# Patient Record
Sex: MALE | Race: WHITE | NOT HISPANIC OR LATINO | URBAN - METROPOLITAN AREA
[De-identification: names, ages, dates, MRNs, and addresses within clinical notes are randomized per-mention and may not be internally consistent; named-entity substitution may affect disease eponyms.]

---

## 2018-09-19 ENCOUNTER — OUTPATIENT (OUTPATIENT)
Dept: OUTPATIENT SERVICES | Facility: HOSPITAL | Age: 73
LOS: 1 days | Discharge: HOME | End: 2018-09-19

## 2018-09-19 DIAGNOSIS — I69.020 APHASIA FOLLOWING NONTRAUMATIC SUBARACHNOID HEMORRHAGE: ICD-10-CM

## 2020-02-04 ENCOUNTER — INPATIENT (INPATIENT)
Facility: HOSPITAL | Age: 75
LOS: 0 days | Discharge: ANOTHER HOSPITAL | End: 2020-02-05
Attending: INTERNAL MEDICINE | Admitting: INTERNAL MEDICINE
Payer: MEDICARE

## 2020-02-04 VITALS — OXYGEN SATURATION: 99 % | RESPIRATION RATE: 20 BRPM | HEART RATE: 100 BPM

## 2020-02-04 DIAGNOSIS — R55 SYNCOPE AND COLLAPSE: ICD-10-CM

## 2020-02-04 DIAGNOSIS — C15.9 MALIGNANT NEOPLASM OF ESOPHAGUS, UNSPECIFIED: ICD-10-CM

## 2020-02-04 DIAGNOSIS — K31.1 ADULT HYPERTROPHIC PYLORIC STENOSIS: ICD-10-CM

## 2020-02-04 DIAGNOSIS — Z96.649 PRESENCE OF UNSPECIFIED ARTIFICIAL HIP JOINT: Chronic | ICD-10-CM

## 2020-02-04 DIAGNOSIS — N40.0 BENIGN PROSTATIC HYPERPLASIA WITHOUT LOWER URINARY TRACT SYMPTOMS: ICD-10-CM

## 2020-02-04 DIAGNOSIS — M1A.9XX0 CHRONIC GOUT, UNSPECIFIED, WITHOUT TOPHUS (TOPHI): ICD-10-CM

## 2020-02-04 DIAGNOSIS — Z93.1 GASTROSTOMY STATUS: ICD-10-CM

## 2020-02-04 DIAGNOSIS — F32.9 MAJOR DEPRESSIVE DISORDER, SINGLE EPISODE, UNSPECIFIED: ICD-10-CM

## 2020-02-04 DIAGNOSIS — R53.1 WEAKNESS: ICD-10-CM

## 2020-02-04 DIAGNOSIS — Z91.81 HISTORY OF FALLING: ICD-10-CM

## 2020-02-04 DIAGNOSIS — I69.320 APHASIA FOLLOWING CEREBRAL INFARCTION: ICD-10-CM

## 2020-02-04 DIAGNOSIS — Z87.891 PERSONAL HISTORY OF NICOTINE DEPENDENCE: ICD-10-CM

## 2020-02-04 DIAGNOSIS — H26.9 UNSPECIFIED CATARACT: Chronic | ICD-10-CM

## 2020-02-04 DIAGNOSIS — K86.2 CYST OF PANCREAS: ICD-10-CM

## 2020-02-04 LAB
ALBUMIN SERPL ELPH-MCNC: 3.5 G/DL — SIGNIFICANT CHANGE UP (ref 3.5–5.2)
ALP SERPL-CCNC: 79 U/L — SIGNIFICANT CHANGE UP (ref 30–115)
ALT FLD-CCNC: 56 U/L — HIGH (ref 0–41)
ANION GAP SERPL CALC-SCNC: 12 MMOL/L — SIGNIFICANT CHANGE UP (ref 7–14)
APPEARANCE UR: ABNORMAL
AST SERPL-CCNC: 40 U/L — SIGNIFICANT CHANGE UP (ref 0–41)
BACTERIA # UR AUTO: NEGATIVE — SIGNIFICANT CHANGE UP
BASE EXCESS BLDV CALC-SCNC: 2.5 MMOL/L — HIGH (ref -2–2)
BASE EXCESS BLDV CALC-SCNC: 3.7 MMOL/L — HIGH (ref -2–2)
BASOPHILS # BLD AUTO: 0.06 K/UL — SIGNIFICANT CHANGE UP (ref 0–0.2)
BASOPHILS NFR BLD AUTO: 0.4 % — SIGNIFICANT CHANGE UP (ref 0–1)
BILIRUB SERPL-MCNC: 0.2 MG/DL — SIGNIFICANT CHANGE UP (ref 0.2–1.2)
BILIRUB UR-MCNC: NEGATIVE — SIGNIFICANT CHANGE UP
BUN SERPL-MCNC: 50 MG/DL — HIGH (ref 10–20)
CA-I SERPL-SCNC: 1.2 MMOL/L — SIGNIFICANT CHANGE UP (ref 1.12–1.3)
CA-I SERPL-SCNC: 1.27 MMOL/L — SIGNIFICANT CHANGE UP (ref 1.12–1.3)
CALCIUM SERPL-MCNC: 9.4 MG/DL — SIGNIFICANT CHANGE UP (ref 8.5–10.1)
CHLORIDE SERPL-SCNC: 97 MMOL/L — LOW (ref 98–110)
CO2 SERPL-SCNC: 27 MMOL/L — SIGNIFICANT CHANGE UP (ref 17–32)
COLOR SPEC: SIGNIFICANT CHANGE UP
CREAT SERPL-MCNC: 1.1 MG/DL — SIGNIFICANT CHANGE UP (ref 0.7–1.5)
DIFF PNL FLD: NEGATIVE — SIGNIFICANT CHANGE UP
EOSINOPHIL # BLD AUTO: 0.11 K/UL — SIGNIFICANT CHANGE UP (ref 0–0.7)
EOSINOPHIL NFR BLD AUTO: 0.7 % — SIGNIFICANT CHANGE UP (ref 0–8)
EPI CELLS # UR: 0 /HPF — SIGNIFICANT CHANGE UP (ref 0–5)
GAS PNL BLDV: 134 MMOL/L — LOW (ref 136–145)
GAS PNL BLDV: 137 MMOL/L — SIGNIFICANT CHANGE UP (ref 136–145)
GAS PNL BLDV: SIGNIFICANT CHANGE UP
GAS PNL BLDV: SIGNIFICANT CHANGE UP
GLUCOSE SERPL-MCNC: 104 MG/DL — HIGH (ref 70–99)
GLUCOSE UR QL: NEGATIVE — SIGNIFICANT CHANGE UP
HCO3 BLDV-SCNC: 28 MMOL/L — SIGNIFICANT CHANGE UP (ref 22–29)
HCO3 BLDV-SCNC: 31 MMOL/L — HIGH (ref 22–29)
HCT VFR BLD CALC: 32.8 % — LOW (ref 42–52)
HCT VFR BLDA CALC: 27 % — LOW (ref 34–44)
HCT VFR BLDA CALC: 35.3 % — SIGNIFICANT CHANGE UP (ref 34–44)
HGB BLD CALC-MCNC: 11.5 G/DL — LOW (ref 14–18)
HGB BLD CALC-MCNC: 8.8 G/DL — LOW (ref 14–18)
HGB BLD-MCNC: 10.7 G/DL — LOW (ref 14–18)
HYALINE CASTS # UR AUTO: 0 /LPF — SIGNIFICANT CHANGE UP (ref 0–7)
IMM GRANULOCYTES NFR BLD AUTO: 0.7 % — HIGH (ref 0.1–0.3)
KETONES UR-MCNC: NEGATIVE — SIGNIFICANT CHANGE UP
LACTATE BLDV-MCNC: 1.6 MMOL/L — SIGNIFICANT CHANGE UP (ref 0.5–1.6)
LACTATE BLDV-MCNC: 2.5 MMOL/L — HIGH (ref 0.5–1.6)
LEUKOCYTE ESTERASE UR-ACNC: NEGATIVE — SIGNIFICANT CHANGE UP
LYMPHOCYTES # BLD AUTO: 15.4 % — LOW (ref 20.5–51.1)
LYMPHOCYTES # BLD AUTO: 2.53 K/UL — SIGNIFICANT CHANGE UP (ref 1.2–3.4)
MCHC RBC-ENTMCNC: 27.9 PG — SIGNIFICANT CHANGE UP (ref 27–31)
MCHC RBC-ENTMCNC: 32.6 G/DL — SIGNIFICANT CHANGE UP (ref 32–37)
MCV RBC AUTO: 85.4 FL — SIGNIFICANT CHANGE UP (ref 80–94)
MONOCYTES # BLD AUTO: 1.25 K/UL — HIGH (ref 0.1–0.6)
MONOCYTES NFR BLD AUTO: 7.6 % — SIGNIFICANT CHANGE UP (ref 1.7–9.3)
NEUTROPHILS # BLD AUTO: 12.37 K/UL — HIGH (ref 1.4–6.5)
NEUTROPHILS NFR BLD AUTO: 75.2 % — SIGNIFICANT CHANGE UP (ref 42.2–75.2)
NITRITE UR-MCNC: NEGATIVE — SIGNIFICANT CHANGE UP
NRBC # BLD: 0 /100 WBCS — SIGNIFICANT CHANGE UP (ref 0–0)
PCO2 BLDV: 48 MMHG — SIGNIFICANT CHANGE UP (ref 41–51)
PCO2 BLDV: 61 MMHG — HIGH (ref 41–51)
PH BLDV: 7.32 — SIGNIFICANT CHANGE UP (ref 7.26–7.43)
PH BLDV: 7.37 — SIGNIFICANT CHANGE UP (ref 7.26–7.43)
PH UR: 8 — SIGNIFICANT CHANGE UP (ref 5–8)
PLATELET # BLD AUTO: 309 K/UL — SIGNIFICANT CHANGE UP (ref 130–400)
PO2 BLDV: 16 MMHG — LOW (ref 20–40)
PO2 BLDV: 31 MMHG — SIGNIFICANT CHANGE UP (ref 20–40)
POTASSIUM BLDV-SCNC: 4.4 MMOL/L — SIGNIFICANT CHANGE UP (ref 3.3–5.6)
POTASSIUM BLDV-SCNC: 4.8 MMOL/L — SIGNIFICANT CHANGE UP (ref 3.3–5.6)
POTASSIUM SERPL-MCNC: 5.4 MMOL/L — HIGH (ref 3.5–5)
POTASSIUM SERPL-SCNC: 5.4 MMOL/L — HIGH (ref 3.5–5)
PROT SERPL-MCNC: 6 G/DL — SIGNIFICANT CHANGE UP (ref 6–8)
PROT UR-MCNC: SIGNIFICANT CHANGE UP
RBC # BLD: 3.84 M/UL — LOW (ref 4.7–6.1)
RBC # FLD: 14.4 % — SIGNIFICANT CHANGE UP (ref 11.5–14.5)
RBC CASTS # UR COMP ASSIST: 1 /HPF — SIGNIFICANT CHANGE UP (ref 0–4)
SAO2 % BLDV: 14 % — SIGNIFICANT CHANGE UP
SAO2 % BLDV: 52 % — SIGNIFICANT CHANGE UP
SODIUM SERPL-SCNC: 136 MMOL/L — SIGNIFICANT CHANGE UP (ref 135–146)
SP GR SPEC: 1.03 — HIGH (ref 1.01–1.02)
TROPONIN T SERPL-MCNC: <0.01 NG/ML — SIGNIFICANT CHANGE UP
UROBILINOGEN FLD QL: SIGNIFICANT CHANGE UP
WBC # BLD: 16.43 K/UL — HIGH (ref 4.8–10.8)
WBC # FLD AUTO: 16.43 K/UL — HIGH (ref 4.8–10.8)
WBC UR QL: 1 /HPF — SIGNIFICANT CHANGE UP (ref 0–5)

## 2020-02-04 PROCEDURE — 74177 CT ABD & PELVIS W/CONTRAST: CPT | Mod: 26

## 2020-02-04 PROCEDURE — 72170 X-RAY EXAM OF PELVIS: CPT | Mod: 26

## 2020-02-04 PROCEDURE — 74018 RADEX ABDOMEN 1 VIEW: CPT | Mod: 26

## 2020-02-04 PROCEDURE — 71045 X-RAY EXAM CHEST 1 VIEW: CPT | Mod: 26

## 2020-02-04 PROCEDURE — 72125 CT NECK SPINE W/O DYE: CPT | Mod: 26

## 2020-02-04 PROCEDURE — 99285 EMERGENCY DEPT VISIT HI MDM: CPT | Mod: GC

## 2020-02-04 PROCEDURE — 93010 ELECTROCARDIOGRAM REPORT: CPT

## 2020-02-04 PROCEDURE — 70450 CT HEAD/BRAIN W/O DYE: CPT | Mod: 26

## 2020-02-04 PROCEDURE — 99221 1ST HOSP IP/OBS SF/LOW 40: CPT

## 2020-02-04 RX ORDER — METRONIDAZOLE 500 MG
500 TABLET ORAL ONCE
Refills: 0 | Status: COMPLETED | OUTPATIENT
Start: 2020-02-04 | End: 2020-02-04

## 2020-02-04 RX ORDER — MORPHINE SULFATE 50 MG/1
2 CAPSULE, EXTENDED RELEASE ORAL EVERY 6 HOURS
Refills: 0 | Status: DISCONTINUED | OUTPATIENT
Start: 2020-02-04 | End: 2020-02-05

## 2020-02-04 RX ORDER — IOHEXOL 300 MG/ML
30 INJECTION, SOLUTION INTRAVENOUS ONCE
Refills: 0 | Status: COMPLETED | OUTPATIENT
Start: 2020-02-04 | End: 2020-02-04

## 2020-02-04 RX ORDER — SODIUM CHLORIDE 9 MG/ML
2000 INJECTION INTRAMUSCULAR; INTRAVENOUS; SUBCUTANEOUS ONCE
Refills: 0 | Status: COMPLETED | OUTPATIENT
Start: 2020-02-04 | End: 2020-02-04

## 2020-02-04 RX ORDER — CHLORHEXIDINE GLUCONATE 213 G/1000ML
1 SOLUTION TOPICAL
Refills: 0 | Status: DISCONTINUED | OUTPATIENT
Start: 2020-02-04 | End: 2020-02-05

## 2020-02-04 RX ORDER — CIPROFLOXACIN LACTATE 400MG/40ML
400 VIAL (ML) INTRAVENOUS ONCE
Refills: 0 | Status: COMPLETED | OUTPATIENT
Start: 2020-02-04 | End: 2020-02-04

## 2020-02-04 RX ORDER — MORPHINE SULFATE 50 MG/1
2 CAPSULE, EXTENDED RELEASE ORAL ONCE
Refills: 0 | Status: DISCONTINUED | OUTPATIENT
Start: 2020-02-04 | End: 2020-02-04

## 2020-02-04 RX ORDER — HEPARIN SODIUM 5000 [USP'U]/ML
5000 INJECTION INTRAVENOUS; SUBCUTANEOUS EVERY 8 HOURS
Refills: 0 | Status: DISCONTINUED | OUTPATIENT
Start: 2020-02-04 | End: 2020-02-05

## 2020-02-04 RX ADMIN — MORPHINE SULFATE 2 MILLIGRAM(S): 50 CAPSULE, EXTENDED RELEASE ORAL at 15:11

## 2020-02-04 RX ADMIN — SODIUM CHLORIDE 2000 MILLILITER(S): 9 INJECTION INTRAMUSCULAR; INTRAVENOUS; SUBCUTANEOUS at 14:48

## 2020-02-04 RX ADMIN — IOHEXOL 30 MILLILITER(S): 300 INJECTION, SOLUTION INTRAVENOUS at 16:30

## 2020-02-04 RX ADMIN — Medication 100 MILLIGRAM(S): at 17:19

## 2020-02-04 RX ADMIN — MORPHINE SULFATE 2 MILLIGRAM(S): 50 CAPSULE, EXTENDED RELEASE ORAL at 17:19

## 2020-02-04 RX ADMIN — Medication 200 MILLIGRAM(S): at 17:13

## 2020-02-04 NOTE — ED PROVIDER NOTE - PHYSICAL EXAMINATION
PHYSICAL EXAM: I have reviewed current vital signs.  GENERAL: NAD, elderly, mild diffuse pallor.  HEAD:  Normocephalic, atraumatic.  EYES: Conjunctiva and sclera clear.  ENT: MMM, no erythema/exudates.  NECK: Supple, FROM.  CHEST/LUNG: Clear to auscultation bilaterally; no wheezes, rales, or rhonchi.  HEART: Regular rate and rhythm, normal S1 and S2; no murmurs, rubs, or gallops.  ABDOMEN: Soft, nontender, mild diffuse TTP, no peritoneal signs, PEG tube in place.  EXTREMITIES:  2+ peripheral pulses; no edema.  NEUROLOGY: A&O x 3. Motor 5/5. No focal neurological deficits.  SKIN: Warm and dry. PHYSICAL EXAM: I have reviewed current vital signs.  GENERAL: NAD, elderly, mild diffuse pallor.  HEAD:  Normocephalic, atraumatic.  EYES: Conjunctiva and sclera clear.  ENT: MMM, no erythema/exudates.  NECK: Supple, FROM.  CHEST/LUNG: Clear to auscultation bilaterally; no wheezes, rales, or rhonchi.  HEART: Regular rate and rhythm, normal S1 and S2; no murmurs, rubs, or gallops.  ABDOMEN: Soft, nontender, mild diffuse TTP, mild distention, no peritoneal signs, PEG tube in place.  EXTREMITIES:  2+ peripheral pulses; no edema.  NEUROLOGY: A&O x 3. Motor 5/5. No focal neurological deficits.  SKIN: Warm and dry.

## 2020-02-04 NOTE — H&P ADULT - NSHPREVIEWOFSYSTEMS_GEN_ALL_CORE
ROS : As per HPI    Vital Signs Last 24 Hrs  T(C): 36.1 (04 Feb 2020 15:46), Max: 36.4 (04 Feb 2020 14:48)  T(F): 96.9 (04 Feb 2020 15:46), Max: 97.5 (04 Feb 2020 14:48)  HR: 86 (04 Feb 2020 21:09) (86 - 100)  BP: 104/67 (04 Feb 2020 21:09) (98/61 - 104/67)  RR: 18 (04 Feb 2020 21:09) (18 - 20)  SpO2: 98% (04 Feb 2020 21:09) (98% - 99%)

## 2020-02-04 NOTE — ED ADULT NURSE REASSESSMENT NOTE - NS ED NURSE REASSESS COMMENT FT1
Pt assessed. G tube to low intermittent suction maintained per MD order. Family at bedside. Pt resting comfortably in bed. Will cont to monitor.

## 2020-02-04 NOTE — ED PROVIDER NOTE - PROGRESS NOTE DETAILS
CO- surgery consulted for partial gastric outlet obstruction pt endorsed to Dr. Crane- pending surgery recs, dispo Oncology care is at [facility]. Spoke to surgery resident loreto of [facility], case discussed, fellow currently scrubbed and dr. robles not available at this time. awaiting callback from fellow. G tube was connected to suction. 1200cc of dark fluid drained, not coffee ground. Hb stable compared to prior records patient's family have with them, Hb on 1/27 was 10.7. Had extensive discussion with patient and family at bedside. They wish for patient to be transferred to [facility] where his care is. Initially unable to reach the fellow and thus has no accepting physician. Patient not stable for discharge given possible obstruction and family not wanting to sign AMA so plan now is to admit pending accepting physician for transfer to [facility]. Fellow from oncologic surgery did call back after patient was admitted but states their team was not the one that did the PEG tube and thus cannot accept the transfer. Plan is still to admit with eventual plan for transfer.

## 2020-02-04 NOTE — ED ADULT NURSE NOTE - NSIMPLEMENTINTERV_GEN_ALL_ED
Implemented All Fall with Harm Risk Interventions:  [place] to call system. Call bell, personal items and telephone within reach. Instruct patient to call for assistance. Room bathroom lighting operational. Non-slip footwear when patient is off stretcher. Physically safe environment: no spills, clutter or unnecessary equipment. Stretcher in lowest position, wheels locked, appropriate side rails in place. Provide visual cue, wrist band, yellow gown, etc. Monitor gait and stability. Monitor for mental status changes and reorient to person, place, and time. Review medications for side effects contributing to fall risk. Reinforce activity limits and safety measures with patient and family. Provide visual clues: red socks.

## 2020-02-04 NOTE — H&P ADULT - ASSESSMENT
73 yo male with CVA with aphasia ( 6 yrs ago), NHL, recently diagnosed esophageal Ca s/p PEG tube placement 2 weeks ago, presented with generalized weakness with ?syncopal episode vs unwitnessed fall, found to have partial gastric outlet obstruction with pancreatic cystic mass seen on imaging.     #Generalized weakness with ?syncope  -EKG showed NSR; Likely vasovagal   -Check orthostatic vitals   -Hold BP meds   -Fall precautions  -DVT ppx    #Partial gastric outlet obstruction   -s/p PEG tube placement at [place] 2 weeks ago  -Surgery eval done  -Keep Gtube to gravity or low intermittent suction  -Serial KUB  -No need for NGT placement  -Transfer to [place] in the AM (Dr Elenita Smith is the surgeon who placed Gtube) for further management     #Hx of esophageal Ca   -Oncology care at [facility] [place]  -Pt to have mediport placed on 2/10/2020 for neoadjuvant chemotherapy at [place]    #Pancreatic cystic mass   -11 x8.5mm ; Likely IPMN  -Outpatient oncology follow up    #Hx of CVA with aphasia  - Plavix on hold due to Mediport placement at [place] on 2/10; c/w statin    #BPH - On flomax; Hold for now given low BP  #    #DVT ppx: Heparin sq  #GI ppx: Not indicated  #Diet: NPO  #Activity: IAT  #FULL CODE -to be dicussed w/ oncology at [facility] [place]  #Dispo: Pending transfer to [facility] [place] in the AM once accepting physician available. 73 yo male with CVA with aphasia ( 6 yrs ago), NHL, recently diagnosed esophageal Ca s/p PEG tube placement 2 weeks ago, presented with generalized weakness with ?syncopal episode vs unwitnessed fall, found to have partial gastric outlet obstruction with pancreatic cystic mass seen on imaging.     #Generalized weakness with ?syncope  -EKG showed NSR; Likely vasovagal in nature given low BP at home  -Check orthostatic vitals   -Hold BP meds including carvedilol for now  -Fall precautions  -DVT ppx  -Outpt follow up with cardiology  -Last echo 6 months ago as per wife ,and it showed normal EF (had hx of cardiomyopathy before which resolved) , and normal stress test    #Partial gastric outlet obstruction   -s/p PEG tube placement at [place] 2 weeks ago  -Surgery eval done  -Keep Gtube to gravity or low intermittent suction  -Serial KUB  -No need for NGT placement  -Transfer to [place] in the AM (Dr Elenita Smith is the surgeon who placed Gtube) for further management .   -TRANSFER NUMBER : [id number]    #Hx of esophageal Ca (diagnosed last month)  -s/p PEG tube placement  -Oncology care at [facility] [place]  -Pt to have mediport placed on 2/10/2020 for neoadjuvant chemotherapy at [place]    #Pancreatic cystic mass   -11 x8.5mm ; Likely IPMN  -Outpatient oncology follow up    #Hx of CVA with aphasia  -Plavix on hold due to Mediport placement at [place] on 2/10; c/w statin    #BPH - On flomax  #Gout- c/w colchicine  #Unclear why pt is on desmopressin -as per wife, he had been taking it for years.   #Depression-c/w Escitalopram    #DVT ppx: Heparin sq  #GI ppx: Not indicated  #Diet: NPO  #Activity: IAT  #FULL CODE -to be discussed w/ oncology at [facility]  #Dispo: Pending transfer to [facility] in the AM once accepting physician available. 73 yo male with CVA with aphasia ( 6 yrs ago), NHL, recently diagnosed esophageal Ca s/p PEG tube placement 2 weeks ago, presented with generalized weakness with ?syncopal episode vs unwitnessed fall, found to have partial gastric outlet obstruction with pancreatic cystic mass seen on imaging.     #Generalized weakness with ?syncope  -EKG showed NSR; Likely vasovagal in nature given low BP at home  -Check orthostatic vitals   -Hold BP meds including carvedilol for now  -Fall precautions  -DVT ppx  -Outpt follow up with cardiology  -Last echo 6 months ago as per wife ,and it showed normal EF (had hx of cardiomyopathy before which resolved) , and normal stress test    #Partial gastric outlet obstruction   -s/p PEG tube placement at [place] 2 weeks ago  -Surgery eval done  -Keep Gtube to gravity or low intermittent suction  -Serial KUB  -No need for NGT placement  -Transfer to [place] in the AM (Dr Elenita Smith is the surgeon who placed Gtube) for further management .   -TRANSFER NUMBER : [id number]    #Hx of esophageal Ca (diagnosed last month)  -s/p PEG tube placement  -Oncology care at [facility]  -Pt to have mediport placed on 2/10/2020 for neoadjuvant chemotherapy at [place]    #Pancreatic cystic mass   -11 x8.5mm ; Likely IPMN  -Outpatient oncology follow up    #Hx of CVA with aphasia  -Plavix on hold due to Mediport placement at [place] on 2/10; c/w statin    #BPH - On flomax  #Gout- c/w colchicine  #Unclear why pt is on desmopressin -as per wife, he had been taking it for years.   #Depression-c/w Escitalopram  #NHL -On observation at this time. Outpt follow up    #DVT ppx: Heparin sq  #GI ppx: Not indicated  #Diet: NPO  #Activity: IAT  #FULL CODE -to be discussed w/ oncology at [facility]  #Dispo: Pending transfer to [facility] in the AM once accepting physician available.

## 2020-02-04 NOTE — H&P ADULT - ATTENDING COMMENTS
Patient seen and evaluated. Agree with the resident as above except as noted on my note from 2/5/20  Not verbal. d/w wife.   Pending transfer to [place]

## 2020-02-04 NOTE — ED ADULT NURSE NOTE - OBJECTIVE STATEMENT
Pt BIBA for possible syncope and abd pain at home. As per pt's wife, Pt was closing his eyes and was not responding for approx 5 sec, and had unwitnessed fall today. unknown head trauma. Abrasion noted to R arm. Pt awake and alert. Pt nonverbal. As per pt's wife, pt changed medications yesterday. Denies any other symptoms. Pt scheduled for port access feb 10/2020 and did not take plavix today. HX esophageal cancer

## 2020-02-04 NOTE — ED PROVIDER NOTE - NS ED ROS FT
Constitutional:  No known fevers.  Eyes:  No eye discharge.  Neck:  No neck stiffness.  Cardiac:  No edema.  Resp:  No cough/hemoptysis.   GI:  No vomiting, diarrhea. +Abdominal pain.  :  No hematuria.  Neuro:  No changes in mental status.  Skin:  No skin rash.

## 2020-02-04 NOTE — ED PROVIDER NOTE - CLINICAL SUMMARY MEDICAL DECISION MAKING FREE TEXT BOX
Patient was signed out to me (Dr. Crane) by Dr. Grayson. 75yo M with recently diagnosed esophageal CA s/p PEG tube placement 2 weeks ago presents to ED after fall, possible syncope, weight loss, generalized weakness. Elevated lactate and leukocytosis, ABx and fluid bolus were given. Afebrile. CT showing distended stomach with poss gastric outlet obstruction as well as poss IPMN. Eval by surgery, suggested to place PEG on suction which drained approx 1200mL fluid. No acute surgical intervention. Admitted with plan for transfer to [facility] (see progress notes).

## 2020-02-04 NOTE — ED ADULT TRIAGE NOTE - CHIEF COMPLAINT QUOTE
pt BIBA for possible syncope at home, EMS and RN unable to obtain Blood pressure, pt awake and alert, aphasic from previous stroke. Cardiologist changed patients medications yesterday. Pt pale and cool, denies CP.

## 2020-02-04 NOTE — CONSULT NOTE ADULT - ASSESSMENT
74M with pancreatic mass and gastric outlet obstruction, known Esophageal CA, currently f/u with surgical oncologist at [facility] [place]    Plan:  Resuscitation  Gtube to gravity/low intermittent suction - no need for NG tube placement  After extensive discussion with patients wife - family requests transfer to [facility] [place] [facility] for management/continuity of care, patient without surgical emergency at this time and agree with transfer     d/w Dr. Arteaga

## 2020-02-04 NOTE — ED PROVIDER NOTE - ATTENDING CONTRIBUTION TO CARE
73yo M with PMH of aphasia, CVA, non-hodgkins lymphoma, recently diagnosed esophageal cancer s/p PEG tube placement 2 weeks ago  presents to ed for fall, possible syncope. wife states pt has been losing weight and feeling weaker. states she found pt on the floor. pt unable to communicate if there was loc. no known fever, chills, vomiting, diarrhea, black/bloody stools. +some SOB.    vs bp borderline  gen- elderly, frail  card-rrr  lungs-ctab, no wheezing or rhonchi  abd-soft, distended, diffusely tender, peg in place  neuro- limited, moving all extremities, no gross CN deficits    c/f intra-abd pathology, sbo, colitis, dehydration  belly labs, fluids, ctap w/ oral contrast thru peg, analgesia, surgery prn

## 2020-02-04 NOTE — CONSULT NOTE ADULT - SUBJECTIVE AND OBJECTIVE BOX
CONRADOCYNTHIAJODI [id number]  74y Male PMH listed below with known history of esophageal CA s/p recent PEG at [facility]. Patient planned for Mediport this week for neoadjuvant. Patient presents with low BP and weakness as per wife at bedside. Wife states patient had low BP called cardiologist who reccomend holding meds until BP is above 100, patients wife said he then fell vs syncopized 2/2 weakness. Patient was scanned on admission to r/o traumatic injury and was found to have large pancreatic cystic mass with partial gastric outlet obstruction      PAST MEDICAL & SURGICAL HISTORY:  CVA (cerebral vascular accident) - Aphasia  Esophageal cancer  Non Hodgkin's lymphoma  History of hip replacement  Cataract        MEDICATIONS  (STANDING):    MEDICATIONS  (PRN):      Allergies    No Known Drug Allergies  Originally Entered as [HIVES] reaction to [shrimp] (Unknown)  shellfish (Other)    Intolerances        REVIEW OF SYSTEMS    [ ] A ten-point review of systems was otherwise negative except as noted.  [X] Due to altered mental status/intubation, subjective information were not able to be obtained from the patient. History was obtained, to the extent possible, from review of the chart and collateral sources of information.      Vital Signs Last 24 Hrs  T(C): 36.1 (04 Feb 2020 15:46), Max: 36.4 (04 Feb 2020 14:48)  T(F): 96.9 (04 Feb 2020 15:46), Max: 97.5 (04 Feb 2020 14:48)  HR: 87 (04 Feb 2020 15:46) (87 - 100)  BP: 98/61 (04 Feb 2020 15:46) (98/61 - 100/65)  BP(mean): --  RR: 19 (04 Feb 2020 15:46) (18 - 20)  SpO2: 98% (04 Feb 2020 15:46) (98% - 99%)    PHYSICAL EXAM:  GENERAL: NAD, aphasic, non responsive  CHEST/LUNG: Clear to auscultation bilaterally  HEART: Regular rate and rhythm  ABDOMEN: Soft, distended, PEG in place      LABS:  Labs:  CAPILLARY BLOOD GLUCOSE                              10.7   16.43 )-----------( 309      ( 04 Feb 2020 14:35 )             32.8       Auto Immature Granulocyte %: 0.7 % (02-04-20 @ 14:35)  Auto Neutrophil %: 75.2 % (02-04-20 @ 14:35)    02-04    136  |  97<L>  |  50<H>  ----------------------------<  104<H>  5.4<H>   |  27  |  1.1      Calcium, Total Serum: 9.4 mg/dL (02-04-20 @ 14:35)      LFTs:             6.0  | 0.2  | 40       ------------------[79      ( 04 Feb 2020 14:35 )  3.5  | x    | 56          Lipase:x      Amylase:x         Blood Gas Venous - Lactate: 2.5 mmoL/L (02-04-20 @ 15:03)      Coags:    CARDIAC MARKERS ( 04 Feb 2020 14:35 )  x     / <0.01 ng/mL / x     / x     / x                RADIOLOGY & ADDITIONAL STUDIES:  < from: CT Abdomen and Pelvis w/ Oral Cont and w/ IV Cont (02.04.20 @ 16:45) >  IMPRESSION: Gastrostomy and chest distention are noted. Rule out partial gastric outlet obstruction.    An 11 x 8.5 mm cystic structure is noted in the mid body of the pancreas. This may represent an IPMN    No evidence of acute intra-thoracic, abdominal or pelvic injury or hematoma.    < end of copied text >    < from: CT Head No Cont (02.04.20 @ 16:35) >  Large zone of encephalomalacia, noted in the left frontal-temporal-parietal cortical and deep white matter regions, consistent with the patient's known previous infarction.   Parenchymal atrophy compatible with the patient's age  No CT evidence of acute fracture, intracranial hemorrhage, midline shift, or mass effect.    < end of copied text >    < from: CT Cervical Spine No Cont (02.04.20 @ 16:42) >  Straightening of the cervical lordosis which may be due to muscle spasm or external collar.    No acute fracture or subluxation.    Incidentally noted dilated fluid-filled mid esophagus with an air-fluid level, see series 2 image 138. This could be a transient finding however a distal narrowing cannot be excluded. Clinical correlation is advised.    < end of copied text >  < from: Xray Pelvis AP only (02.04.20 @ 16:13) >  No evidence of acute fracture.      < end of copied text >    < from: Xray Kidney Ureter Bladder (02.04.20 @ 16:12) >  Large gaseous distention of stomach in the left upper abdomen.    Evaluation for free air is limited on this single supine image.    < end of copied text >

## 2020-02-04 NOTE — H&P ADULT - NSICDXPASTMEDICALHX_GEN_ALL_CORE_FT
PAST MEDICAL HISTORY:  CVA (cerebral vascular accident)     Esophageal cancer     Non Hodgkin's lymphoma PAST MEDICAL HISTORY:  CVA (cerebral vascular accident) with aphasia    Depression     Esophageal cancer s/p PEG   Port placement on 2/10 for neoadjuvant therapy    Gout     History of BPH     Non Hodgkin's lymphoma on observation

## 2020-02-04 NOTE — ED ADULT NURSE NOTE - NS ED NURSE RECORD ANOTHER HT AND WT
[de-identified] : Discussed at length the nature of the patients condition. Their bilateral knee symptoms appear secondary to degenerative arthritis with varus deformity. We reviewed operative and nonoperative treatment. While I believe he would eventually benefit from a TKR, he is hesitant to have surgery at this time. Therefore, we will continue nonoperatively.  Since he has had a good prior response, we will seek authorization for another series of bilateral knee Euflexxa injections. Once we receive authorization, we will proceed accordingly. I also suggested home exercise, weight loss, and Advil or Aleve prn. They can continue activities as tolerated.  I did explain that if in the future his pain becomes disabling, that he may need total knee replacement. This was explained in the presence of their wife.  Yes

## 2020-02-04 NOTE — H&P ADULT - NSHPPHYSICALEXAM_GEN_ALL_CORE
PHYSICAL EXAM:  GENERAL: NAD, speaks in full sentences, no signs of respiratory distress  HEAD:  Atraumatic, Normocephalic  EYES: EOMI, PERRLA, conjunctiva and sclera clear  NECK: Supple, No JVD  CHEST/LUNG: Clear to auscultation bilaterally; No wheeze; No crackles; No accessory muscles used  HEART: Regular rate and rhythm; No murmurs;   ABDOMEN: Soft, Nontender, Nondistended; Bowel sounds present; No guarding  EXTREMITIES:  2+ Peripheral Pulses, No cyanosis or edema  PSYCH: AAOx3  NEUROLOGY: non-focal  SKIN: No rashes or lesions PHYSICAL EXAM:  GENERAL: NAD, non verbal,  no signs of respiratory distress  HEAD:  Atraumatic, Normocephalic  EYES: PERRLA  NECK: Supple, No JVD  CHEST/LUNG: Clear to auscultation bilaterally; No wheeze; No crackles; No accessory muscles used  HEART: Regular rate and rhythm; No murmurs  ABDOMEN: Soft, Nontender, Nondistended; Bowel sounds present; No guarding; PEG tube site looks clean  EXTREMITIES:  2+ Peripheral Pulses, No cyanosis or edema  PSYCH: AAOx3  NEUROLOGY: aphasic; gait could not be assessed. No focal motor weakness or sensory loss  SKIN: No rashes or lesions

## 2020-02-04 NOTE — H&P ADULT - HISTORY OF PRESENT ILLNESS
73 yo male  PMHx: CVA with aphasia ( 6 yrs ago), NHL, recently diagnosed esophageal Ca s/p PEG tube placement 2 weeks ago  CC: Generalized weakness with fall vs syncope?  History dates back to       ER Course: Vitals on admission were /65, HR 97, T 97.5F, 98% on RA. CT A/P showed partial gastric outlet obstruction, and 11 x8.5mm mid pancreatic cystic mass (likely IPMN). CTH showed previous area of encephalomalacia, but no acute intracranial pathology. Xray KUB showed large gasseous distention of abdomen. Pt evaluated by surgery who [place] [facility] [place] there. Er spoke with chao, but as no accepting physician is available at this time, pt to be transferred in the AM. 75 yo male  PMHx: CVA with aphasia ( 6 yrs ago), NHL (On observation currently), recently diagnosed esophageal Ca s/p PEG tube placement 2 weeks ago  CC: Generalized weakness with fall vs syncope?  History dates back to yesterday morning, when patient went to his O/P appointment at [facility] and was found to have low vitals including BP around 79/50 repeatedly, pt was told to go home and follow up with PMD. Pt's wife spoke to the cardiologist (Dr Taylor) who made changes to the medications and all the BP meds were held. Pt complained for feeling wobbly the whole day, and when he went to the bathroom, the wife heard the noise of him falling, and she opened the jammed door, and could not pick him up. Pt's wife called the ambulance and EMS picked them up from the ground after about 10 min of being on the ground. Pt;'s wife said pt responded when she called him, but not sure if he seemed confused or not. Unsure if pt tripped or passed out.   Pt also complained about pain in the abdomen since yesterday. No nausea/vomiting, diarrhea or constipation.   Otherwise, no chest pain, shortness of breath, palpitations, leg swelling.   ER Course: Vitals on admission were /65, HR 97, T 97.5F, 98% on RA. CT A/P showed partial gastric outlet obstruction, and 11 x8.5mm mid pancreatic cystic mass (likely IPMN). CTH showed previous area of encephalomalacia, but no acute intracranial pathology. Xray KUB showed large gasseous distention of abdomen. Pt evaluated by surgery who [place] [facility] [place]. Er spoke with [place], but as no accepting physician is available at this time, pt to be transferred in the AM.

## 2020-02-04 NOTE — CONSULT NOTE ADULT - ATTENDING COMMENTS
I personally examined this patient with the PA and resident and discussed my plan with them.    the patient has goo but is otherwise stable and will be transferred to [facility] for all further diagnostic and theraputic steps.  he does not require emergent surgical intervention here

## 2020-02-04 NOTE — H&P ADULT - NSHPLABSRESULTS_GEN_ALL_CORE
10.7   16.43 )-----------( 309      ( 04 Feb 2020 14:35 )             32.8       02-04    136  |  97<L>  |  50<H>  ----------------------------<  104<H>  5.4<H>   |  27  |  1.1    Ca    9.4      04 Feb 2020 14:35    TPro  6.0  /  Alb  3.5  /  TBili  0.2  /  DBili  x   /  AST  40  /  ALT  56<H>  /  AlkPhos  79  02-04    Blood Gas Profile - Venous (02.04.20 @ 21:28)    pH, Venous: 7.37    pCO2, Venous: 48 mmHg    pO2, Venous: 31 mmHg    HCO3, Venous: 28 mmoL/L    Base Excess, Venous: 2.5 mmoL/L    Oxygen Saturation, Venous: 52 %    < from: CT Abdomen and Pelvis w/ Oral Cont and w/ IV Cont (02.04.20 @ 16:45) >    IMPRESSION: Gastrostomy and chest distention are noted. Rule out partial gastric outlet obstruction.  An 11 x 8.5 mm cystic structure is noted in the mid body of the pancreas. This may represent an IPMN  No evidence of acute intra-thoracic, abdominal or pelvic injury or hematoma.    < from: CT Cervical Spine No Cont (02.04.20 @ 16:42) >    IMPRESSION:  Straightening of the cervical lordosis which may be due to muscle spasm or external collar.  No acute fracture or subluxation.  Incidentally noted dilated fluid-filled mid esophagus with an air-fluid level, see series 2 image 138. This could be a transient finding however a distal narrowing cannot be excluded. Clinical correlation is advised.    < from: CT Head No Cont (02.04.20 @ 16:35) >    IMPRESSION:   Large zone of encephalomalacia, noted in the left frontal-temporal-parietal cortical and deep white matter regions, consistent with the patient's known previous infarction.   Parenchymal atrophy compatible with the patient's age  No CT evidence of acute fracture, intracranial hemorrhage, midline shift, or mass effect.    < from: Xray Kidney Ureter Bladder (02.04.20 @ 16:12) >    Impression:  Large gaseous distention of stomach in the left upper abdomen.  Evaluation for free air is limited on this single supine image.

## 2020-02-05 ENCOUNTER — TRANSCRIPTION ENCOUNTER (OUTPATIENT)
Age: 75
End: 2020-02-05

## 2020-02-05 VITALS
HEART RATE: 94 BPM | TEMPERATURE: 97 F | RESPIRATION RATE: 17 BRPM | DIASTOLIC BLOOD PRESSURE: 67 MMHG | SYSTOLIC BLOOD PRESSURE: 102 MMHG

## 2020-02-05 LAB
ALBUMIN SERPL ELPH-MCNC: 3 G/DL — LOW (ref 3.5–5.2)
ALP SERPL-CCNC: 70 U/L — SIGNIFICANT CHANGE UP (ref 30–115)
ALT FLD-CCNC: 42 U/L — HIGH (ref 0–41)
ANION GAP SERPL CALC-SCNC: 10 MMOL/L — SIGNIFICANT CHANGE UP (ref 7–14)
AST SERPL-CCNC: 32 U/L — SIGNIFICANT CHANGE UP (ref 0–41)
BASOPHILS # BLD AUTO: 0.06 K/UL — SIGNIFICANT CHANGE UP (ref 0–0.2)
BASOPHILS NFR BLD AUTO: 0.4 % — SIGNIFICANT CHANGE UP (ref 0–1)
BILIRUB SERPL-MCNC: 0.3 MG/DL — SIGNIFICANT CHANGE UP (ref 0.2–1.2)
BUN SERPL-MCNC: 36 MG/DL — HIGH (ref 10–20)
CALCIUM SERPL-MCNC: 9.2 MG/DL — SIGNIFICANT CHANGE UP (ref 8.5–10.1)
CHLORIDE SERPL-SCNC: 101 MMOL/L — SIGNIFICANT CHANGE UP (ref 98–110)
CO2 SERPL-SCNC: 31 MMOL/L — SIGNIFICANT CHANGE UP (ref 17–32)
CREAT SERPL-MCNC: 1 MG/DL — SIGNIFICANT CHANGE UP (ref 0.7–1.5)
EOSINOPHIL # BLD AUTO: 0.13 K/UL — SIGNIFICANT CHANGE UP (ref 0–0.7)
EOSINOPHIL NFR BLD AUTO: 0.9 % — SIGNIFICANT CHANGE UP (ref 0–8)
GLUCOSE SERPL-MCNC: 98 MG/DL — SIGNIFICANT CHANGE UP (ref 70–99)
HCT VFR BLD CALC: 27.7 % — LOW (ref 42–52)
HGB BLD-MCNC: 9.2 G/DL — LOW (ref 14–18)
IMM GRANULOCYTES NFR BLD AUTO: 0.4 % — HIGH (ref 0.1–0.3)
LACTATE SERPL-SCNC: 2.1 MMOL/L — HIGH (ref 0.7–2)
LYMPHOCYTES # BLD AUTO: 21.1 % — SIGNIFICANT CHANGE UP (ref 20.5–51.1)
LYMPHOCYTES # BLD AUTO: 3.1 K/UL — SIGNIFICANT CHANGE UP (ref 1.2–3.4)
MAGNESIUM SERPL-MCNC: 2 MG/DL — SIGNIFICANT CHANGE UP (ref 1.8–2.4)
MCHC RBC-ENTMCNC: 27.9 PG — SIGNIFICANT CHANGE UP (ref 27–31)
MCHC RBC-ENTMCNC: 33.2 G/DL — SIGNIFICANT CHANGE UP (ref 32–37)
MCV RBC AUTO: 83.9 FL — SIGNIFICANT CHANGE UP (ref 80–94)
MONOCYTES # BLD AUTO: 0.95 K/UL — HIGH (ref 0.1–0.6)
MONOCYTES NFR BLD AUTO: 6.5 % — SIGNIFICANT CHANGE UP (ref 1.7–9.3)
NEUTROPHILS # BLD AUTO: 10.39 K/UL — HIGH (ref 1.4–6.5)
NEUTROPHILS NFR BLD AUTO: 70.7 % — SIGNIFICANT CHANGE UP (ref 42.2–75.2)
NRBC # BLD: 0 /100 WBCS — SIGNIFICANT CHANGE UP (ref 0–0)
PLATELET # BLD AUTO: 249 K/UL — SIGNIFICANT CHANGE UP (ref 130–400)
POTASSIUM SERPL-MCNC: 4.4 MMOL/L — SIGNIFICANT CHANGE UP (ref 3.5–5)
POTASSIUM SERPL-SCNC: 4.4 MMOL/L — SIGNIFICANT CHANGE UP (ref 3.5–5)
PROT SERPL-MCNC: 5.2 G/DL — LOW (ref 6–8)
RBC # BLD: 3.3 M/UL — LOW (ref 4.7–6.1)
RBC # FLD: 14.4 % — SIGNIFICANT CHANGE UP (ref 11.5–14.5)
SODIUM SERPL-SCNC: 142 MMOL/L — SIGNIFICANT CHANGE UP (ref 135–146)
WBC # BLD: 14.69 K/UL — HIGH (ref 4.8–10.8)
WBC # FLD AUTO: 14.69 K/UL — HIGH (ref 4.8–10.8)

## 2020-02-05 PROCEDURE — 74018 RADEX ABDOMEN 1 VIEW: CPT | Mod: 26

## 2020-02-05 RX ORDER — CARVEDILOL PHOSPHATE 80 MG/1
1 CAPSULE, EXTENDED RELEASE ORAL
Qty: 0 | Refills: 0 | DISCHARGE

## 2020-02-05 RX ORDER — SODIUM CHLORIDE 9 MG/ML
1000 INJECTION INTRAMUSCULAR; INTRAVENOUS; SUBCUTANEOUS
Refills: 0 | Status: DISCONTINUED | OUTPATIENT
Start: 2020-02-05 | End: 2020-02-05

## 2020-02-05 RX ORDER — SODIUM CHLORIDE 9 MG/ML
1000 INJECTION, SOLUTION INTRAVENOUS
Refills: 0 | Status: DISCONTINUED | OUTPATIENT
Start: 2020-02-05 | End: 2020-02-05

## 2020-02-05 RX ORDER — COLCHICINE 0.6 MG
0.6 TABLET ORAL DAILY
Refills: 0 | Status: DISCONTINUED | OUTPATIENT
Start: 2020-02-05 | End: 2020-02-05

## 2020-02-05 RX ORDER — DESMOPRESSIN ACETATE 0.1 MG/1
0.2 TABLET ORAL DAILY
Refills: 0 | Status: DISCONTINUED | OUTPATIENT
Start: 2020-02-05 | End: 2020-02-05

## 2020-02-05 RX ORDER — ESCITALOPRAM OXALATE 10 MG/1
20 TABLET, FILM COATED ORAL DAILY
Refills: 0 | Status: DISCONTINUED | OUTPATIENT
Start: 2020-02-05 | End: 2020-02-05

## 2020-02-05 RX ORDER — TAMSULOSIN HYDROCHLORIDE 0.4 MG/1
0.4 CAPSULE ORAL AT BEDTIME
Refills: 0 | Status: DISCONTINUED | OUTPATIENT
Start: 2020-02-05 | End: 2020-02-05

## 2020-02-05 RX ORDER — ATORVASTATIN CALCIUM 80 MG/1
20 TABLET, FILM COATED ORAL AT BEDTIME
Refills: 0 | Status: DISCONTINUED | OUTPATIENT
Start: 2020-02-05 | End: 2020-02-05

## 2020-02-05 RX ADMIN — DESMOPRESSIN ACETATE 0.2 MILLIGRAM(S): 0.1 TABLET ORAL at 13:39

## 2020-02-05 RX ADMIN — HEPARIN SODIUM 5000 UNIT(S): 5000 INJECTION INTRAVENOUS; SUBCUTANEOUS at 13:40

## 2020-02-05 RX ADMIN — SODIUM CHLORIDE 75 MILLILITER(S): 9 INJECTION INTRAMUSCULAR; INTRAVENOUS; SUBCUTANEOUS at 13:41

## 2020-02-05 RX ADMIN — Medication 0.6 MILLIGRAM(S): at 13:40

## 2020-02-05 RX ADMIN — HEPARIN SODIUM 5000 UNIT(S): 5000 INJECTION INTRAVENOUS; SUBCUTANEOUS at 05:26

## 2020-02-05 RX ADMIN — ESCITALOPRAM OXALATE 20 MILLIGRAM(S): 10 TABLET, FILM COATED ORAL at 13:40

## 2020-02-05 NOTE — DISCHARGE NOTE PROVIDER - NSDCMRMEDTOKEN_GEN_ALL_CORE_FT
atorvastatin 20 mg oral tablet: 1 tab(s) orally once a day  colchicine 0.6 mg oral tablet: 1 tab(s) orally once a day  desmopressin 0.2 mg oral tablet: orally once a day  escitalopram 20 mg oral tablet: 1 tab(s) orally once a day  Plavix 75 mg oral tablet: 1 tab(s) orally once a day  tamsulosin 0.4 mg oral capsule: 1 cap(s) orally once a day

## 2020-02-05 NOTE — DISCHARGE NOTE NURSING/CASE MANAGEMENT/SOCIAL WORK - NSDCPEPTSTRK_GEN_ALL_CORE
Need for follow up after discharge/Prescribed medications/Risk factors for stroke/Stroke education booklet/Call 911 for stroke/Stroke support groups for patients, families, and friends/Stroke warning signs and symptoms/Signs and symptoms of stroke

## 2020-02-05 NOTE — DISCHARGE NOTE NURSING/CASE MANAGEMENT/SOCIAL WORK - NSDCPETBCESMAN_GEN_ALL_CORE
If you are a smoker, it is important for your health to stop smoking. Please be aware that second hand smoke is also harmful. oriented to person, place, time and situation

## 2020-02-05 NOTE — PROGRESS NOTE ADULT - SUBJECTIVE AND OBJECTIVE BOX
SUBJECTIVE:    Patient is a 74y old Male who presents with a chief complaint of Weakness, ?syncope (2020 09:19)    Currently admitted to medicine with the primary diagnosis of Gastric outlet obstruction     Today is hospital day 1d. This morning he is resting comfortably in bed and reports no new issues or overnight events.     ROS:   CONSTITUTIONAL: No weakness, fevers or chills   EYES/ENT: No visual changes; No vertigo or throat pain   NECK: No pain or stiffness   RESPIRATORY: No cough, wheezing, hemoptysis; No shortness of breath   CARDIOVASCULAR: No chest pain or palpitations   GASTROINTESTINAL: 3/10 abdominal pain . No nausea, vomiting, or hematemesis; No diarrhea or constipation. No melena or hematochezia.  GENITOURINARY: No dysuria, frequency or hematuria  NEUROLOGICAL: No numbness or weakness  SKIN: No itching, rashes      PAST MEDICAL & SURGICAL HISTORY  Gout  Depression  History of BPH  CVA (cerebral vascular accident): with aphasia  Esophageal cancer: s/p PEG   Port placement on 2/10 for neoadjuvant therapy  Non Hodgkin's lymphoma: on observation  History of hip replacement  Cataract    ALLERGIES:  No Known Drug Allergies  Originally Entered as [HIVES] reaction to [shrimp] (Unknown)  shellfish (Other)    MEDICATIONS:  STANDING MEDICATIONS  atorvastatin 20 milliGRAM(s) Oral at bedtime  chlorhexidine 4% Liquid 1 Application(s) Topical <User Schedule>  colchicine 0.6 milliGRAM(s) Oral daily  desmopressin 0.2 milliGRAM(s) Oral daily  escitalopram 20 milliGRAM(s) Oral daily  heparin  Injectable 5000 Unit(s) SubCutaneous every 8 hours  sodium chloride 0.9%. 1000 milliLiter(s) IV Continuous <Continuous>    PRN MEDICATIONS  morphine  - Injectable 2 milliGRAM(s) IV Push every 6 hours PRN    VITALS:   T(F): 96.5  HR: 93  BP: 101/66  RR: 18  SpO2: 98%    LABS:                          9.2    14.69 )-----------( 249      ( 2020 05:47 )             27.7     02-05    142  |  101  |  36<H>  ----------------------------<  98  4.4   |  31  |  1.0    Ca    9.2      2020 05:47  Mg     2.0     02-05    TPro  5.2<L>  /  Alb  3.0<L>  /  TBili  0.3  /  DBili  x   /  AST  32  /  ALT  42<H>  /  AlkPhos  70        Urinalysis Basic - ( 2020 14:35 )    Color: Light Yellow / Appearance: Slightly Turbid / S.032 / pH: x  Gluc: x / Ketone: Negative  / Bili: Negative / Urobili: <2 mg/dL   Blood: x / Protein: Trace / Nitrite: Negative   Leuk Esterase: Negative / RBC: 1 /HPF / WBC 1 /HPF   Sq Epi: x / Non Sq Epi: 0 /HPF / Bacteria: Negative      Lactate, Blood: 2.1 mmol/L <H> (20 @ 05:47)  Troponin T, Serum: <0.01 ng/mL (20 @ 14:35)      CARDIAC MARKERS ( 2020 14:35 )  x     / <0.01 ng/mL / x     / x     / x              PHYSICAL EXAM:  GEN: No acute distress  HEENT: normocephalic, atraumatic, aniceteric  LUNGS: Clear to auscultation bilaterally, no rales/wheezing/ rhonchi  HEART: S1/S2 present. RRR, no murmurs  ABD: + BS, soft, non-tender, non-distended, no rebound, no guarding, PEG tube in place  EXT: NC/NC/NE/2+PP/COLVIN  NEURO: AAOX3, normal affect      ASSESSMENT AND PLAN:    73 yo male with CVA with aphasia ( 6 yrs ago), NHL, recently diagnosed esophageal Ca s/p PEG tube placement 2 weeks ago, presented with generalized weakness with ?syncopal episode vs unwitnessed fall, found to have partial gastric outlet obstruction with pancreatic cystic mass seen on imaging.       #Partial gastric outlet obstruction   -s/p PEG tube placement at [facility] 2 weeks ago  -Surgery eval done [id number]  :  Gtube to gravity or low intermittent suction - per sx recommendations   -Serial KUB  -No need for NGT placement  -Transfer to [facility] ([place]) (Dr Elenita Smith is the surgeon who placed Gtube) for further   management .   - hold po meds for now , medications can be given iv if needed       #Generalized weakness  -EKG showed NSR; Likely vasovagal in nature given low BP at home  -f/u  orthostatic vitals   -Hold BP meds including carvedilol for now  -Fall precautions  -DVT ppx  -Outpt follow up with cardiology  -Last echo 6 months ago as per wife ,and it showed normal EF (had hx of cardiomyopathy before which resolved) , and normal stress test  - IVF       #Hx of esophageal Ca (diagnosed last month)  -s/p PEG tube placement  -Oncology care at [facility]  -Pt to have mediport placed on 2/10/2020 for neoadjuvant chemotherapy at [facility]    #Pancreatic cystic mass   -11 x8.5mm ; Likely IPMN  -Outpatient oncology follow up    #Hx of CVA with aphasia  -Plavix on hold due to Mediport placement at [facility] on 2/10; c/w statin    #BPH - On flomax  #Gout- c/w colchicine  #Unclear why pt is on desmopressin -as per wife, he had been taking it for years.   #Depression-c/w Escitalopram  #NHL -On observation at this time. Outpt follow up    #DVT ppx: Heparin sq  #GI ppx: Not indicated  #Diet: NPO  #Activity: IAT  #FULL CODE -to be discussed w/ oncology at [facility]  #Dispo: Pending transfer to [facility] once accepting physician available : call Carol (KIRA) [phone] for updates (she is helping to organize transfer and find an accepting physician)

## 2020-02-05 NOTE — PROVIDER CONTACT NOTE (OTHER) - SITUATION
pt gastronomy tubeto be set to intermittent suction. the suction is not strong enough to pull the output into the canister. Spoke with MD about increasing suction MD tablet said he would look into it.

## 2020-02-05 NOTE — DISCHARGE NOTE PROVIDER - NSDCCPCAREPLAN_GEN_ALL_CORE_FT
PRINCIPAL DISCHARGE DIAGNOSIS  Diagnosis: Gastric outlet obstruction  Assessment and Plan of Treatment: - you were diagnosed with gastric oulet obstruction on CT scan of the abdomen and pelvis with oral contrast and IV contrast and you were seen by surgery team and you were suction via a PEG tube.   - you were kept NPO and given pain medicine for symptom control  - you requested to be transfered to [facility] for continued care. you were accepted by Dr. Aldana (oncology)         SECONDARY DISCHARGE DIAGNOSES  Diagnosis: Pancreatic cyst  Assessment and Plan of Treatment: - you were fouind to have a pancreatic cyst on the CT scan of the abdomen and pelvis measuring 11 x 8.5 cm which might represent IPMN    Diagnosis: Fall  Assessment and Plan of Treatment: - you presented after a fall, you had CT done of cervical spine no contrast which was negative for a fracture. CT head was done wo contrast and was negative for acute pathology.

## 2020-02-05 NOTE — DISCHARGE NOTE PROVIDER - HOSPITAL COURSE
73 yo male CVA with aphasia ( 6 yrs ago), NHL (On observation currently), recently diagnosed esophageal Ca s/p PEG tube placement 2 weeks ago    CC: Generalized weakness with fall vs syncope?    History dates back to yesterday morning, when patient went to his O/P appointment at [facility] and was found to have low vitals including BP around 79/50 repeatedly, pt was told to go home and follow up with PMD. Pt's wife spoke to the cardiologist (Dr Taylor) who made changes to the medications and all the BP meds were held. Pt complained for feeling wobbly the whole day, and when he went to the bathroom, the wife heard the noise of him falling, and she opened the jammed door, and could not pick him up. Pt's wife called the ambulance and EMS picked them up from the ground after about 10 min of being on the ground. Pt;'s wife said pt responded when she called him, but not sure if he seemed confused or not. Unsure if pt tripped or passed out.     Pt also complained about pain in the abdomen since yesterday. No nausea/vomiting, diarrhea or constipation.     Otherwise, no chest pain, shortness of breath, palpitations, leg swelling.     ER Course: Vitals on admission were /65, HR 97, T 97.5F, 98% on RA. CT A/P showed partial gastric outlet obstruction, and 11 x8.5mm mid pancreatic cystic mass (likely IPMN). CTH showed previous area of encephalomalacia, but no acute intracranial pathology. Xray KUB showed large gasseous distention of abdomen. Pt evaluated by surgery who recommended transfer to [facility] as the PEG tube was placed there. Er spoke with guidry, but as no accepting physician is available at this time, pt to be transferred in the AM.         Patient was admitted with gastric outlet obstruction, however the wife requested for the patient to be transferred to [facility] for continued care. He was seen by surgery service and recommended peg to low intermittent suction. The patient's abdominal pain improved on hosptal day 1. The patient was accepted by Dr. Aldana at [facility] in [place] and patient is to be transferred there when the bed is available.         Med rec and plan discussed with

## 2020-02-05 NOTE — DISCHARGE NOTE NURSING/CASE MANAGEMENT/SOCIAL WORK - PATIENT PORTAL LINK FT
You can access the FollowMyHealth Patient Portal offered by [facility] by registering at the following website: [email or web address]. By joining [facility]’s FollowMyHealth portal, you will also be able to view your health information using other applications (apps) compatible with our system.

## 2020-02-05 NOTE — DISCHARGE NOTE PROVIDER - PROVIDER TOKENS
FREE:[LAST:[DOCTOR],FIRST:[MAXWELL],PHONE:[(   )    -],FAX:[(   )    -],ADDRESS:[[facility]]]

## 2020-02-05 NOTE — PROGRESS NOTE ADULT - SUBJECTIVE AND OBJECTIVE BOX
Patient was seen and examined. Spoke with RN. Chart reviewed.  No events overnight.  Vital Signs Last 24 Hrs  T(F): 96.5 (2020 07:30), Max: 98.2 (2020 00:01)  HR: 93 (2020 07:30) (86 - 100)  BP: 101/66 (2020 07:30) (98/61 - 115/70)  SpO2: 98% (2020 00:01) (98% - 99%)  MEDICATIONS  (STANDING):  atorvastatin 20 milliGRAM(s) Oral at bedtime  chlorhexidine 4% Liquid 1 Application(s) Topical <User Schedule>  colchicine 0.6 milliGRAM(s) Oral daily  desmopressin 0.2 milliGRAM(s) Oral daily  escitalopram 20 milliGRAM(s) Oral daily  heparin  Injectable 5000 Unit(s) SubCutaneous every 8 hours    MEDICATIONS  (PRN):  morphine  - Injectable 2 milliGRAM(s) IV Push every 6 hours PRN Severe Pain (7 - 10)    Labs:                        9.2    14.69 )-----------( 249      ( 2020 05:47 )             27.7                         10.7   16.43 )-----------( 309      ( 2020 14:35 )             32.8     2020 05:47    142    |  101    |  36     ----------------------------<  98     4.4     |  31     |  1.0    2020 14:35    136    |  97     |  50     ----------------------------<  104    5.4     |  27     |  1.1      Ca    9.2        2020 05:47  Ca    9.4        2020 14:35  Mg     2.0       2020 05:47    TPro  5.2    /  Alb  3.0    /  TBili  0.3    /  DBili  x      /  AST  32     /  ALT  42     /  AlkPhos  70     2020 05:47  TPro  6.0    /  Alb  3.5    /  TBili  0.2    /  DBili  x      /  AST  40     /  ALT  56     /  AlkPhos  79     2020 14:35      Urinalysis Basic - ( 2020 14:35 )    Color: Light Yellow / Appearance: Slightly Turbid / S.032 / pH: x  Gluc: x / Ketone: Negative  / Bili: Negative / Urobili: <2 mg/dL   Blood: x / Protein: Trace / Nitrite: Negative   Leuk Esterase: Negative / RBC: 1 /HPF / WBC 1 /HPF   Sq Epi: x / Non Sq Epi: 0 /HPF / Bacteria: Negative        General: comfortable, NAD  Neurology: A&O, nonfocal  Head:  Normocephalic, atraumatic  ENT:  Mucosa moist, no ulcerations  Neck:  Supple, no JVD,   Resp: CTA B/L  CV: RRR, S1S2,   GI: Soft, NT, Gtube with dark brown material to suction   MS: No edema, + peripheral pulses,      A/P:  73 yo male with CVA with aphasia ( 6 yrs ago), NHL, recently diagnosed esophageal Ca s/p PEG tube placement 2 weeks ago, presented with generalized weakness with unwitnessed fall, found to have partial gastric outlet obstruction with pancreatic cystic mass seen on imaging.     pending transfer to [place]   d/w wife who wants all further work up done at [place]   monitor BP closely   monitor CBC   obtain orthostatics   monitor for signs of infection   surgery f/u   cardio eval if remains at [facility]   fall precautions   DVT prophylaxis  Decubitus prevention- all measures as per RN protocol  Please call or text me with any questions or updates Patient was seen and examined. Spoke with RN. Chart reviewed.  No events overnight.  Vital Signs Last 24 Hrs  T(F): 96.5 (2020 07:30), Max: 98.2 (2020 00:01)  HR: 93 (2020 07:30) (86 - 100)  BP: 101/66 (2020 07:30) (98/61 - 115/70)  SpO2: 98% (2020 00:01) (98% - 99%)  MEDICATIONS  (STANDING):  atorvastatin 20 milliGRAM(s) Oral at bedtime  chlorhexidine 4% Liquid 1 Application(s) Topical <User Schedule>  colchicine 0.6 milliGRAM(s) Oral daily  desmopressin 0.2 milliGRAM(s) Oral daily  escitalopram 20 milliGRAM(s) Oral daily  heparin  Injectable 5000 Unit(s) SubCutaneous every 8 hours    MEDICATIONS  (PRN):  morphine  - Injectable 2 milliGRAM(s) IV Push every 6 hours PRN Severe Pain (7 - 10)    Labs:                        9.2    14.69 )-----------( 249      ( 2020 05:47 )             27.7                         10.7   16.43 )-----------( 309      ( 2020 14:35 )             32.8     2020 05:47    142    |  101    |  36     ----------------------------<  98     4.4     |  31     |  1.0    2020 14:35    136    |  97     |  50     ----------------------------<  104    5.4     |  27     |  1.1      Ca    9.2        2020 05:47  Ca    9.4        2020 14:35  Mg     2.0       2020 05:47    TPro  5.2    /  Alb  3.0    /  TBili  0.3    /  DBili  x      /  AST  32     /  ALT  42     /  AlkPhos  70     2020 05:47  TPro  6.0    /  Alb  3.5    /  TBili  0.2    /  DBili  x      /  AST  40     /  ALT  56     /  AlkPhos  79     2020 14:35      Urinalysis Basic - ( 2020 14:35 )    Color: Light Yellow / Appearance: Slightly Turbid / S.032 / pH: x  Gluc: x / Ketone: Negative  / Bili: Negative / Urobili: <2 mg/dL   Blood: x / Protein: Trace / Nitrite: Negative   Leuk Esterase: Negative / RBC: 1 /HPF / WBC 1 /HPF   Sq Epi: x / Non Sq Epi: 0 /HPF / Bacteria: Negative        General: comfortable, NAD  Neurology: A&O, nonfocal  Head:  Normocephalic, atraumatic  ENT:  Mucosa moist, no ulcerations  Neck:  Supple, no JVD,   Resp: CTA B/L  CV: RRR, S1S2,   GI: Soft, NT, dist, Gtube with dark brown material to suction   MS: No edema, + peripheral pulses,      A/P:  75 yo male with CVA with aphasia ( 6 yrs ago), NHL, recently diagnosed esophageal Ca s/p PEG tube placement 2 weeks ago, presented with generalized weakness with unwitnessed fall, found to have partial gastric outlet obstruction with pancreatic cystic mass seen on imaging.     pending transfer to [place]   d/w wife who wants all further work up done at [place]   monitor BP closely   monitor CBC   obtain orthostatics   monitor for signs of infection   surgery f/u   cardio eval if remains at [facility]   fall precautions   DVT prophylaxis  Decubitus prevention- all measures as per RN protocol  Please call or text me with any questions or updates

## 2020-02-06 LAB
CULTURE RESULTS: SIGNIFICANT CHANGE UP
HCV AB S/CO SERPL IA: 0.11 S/CO — SIGNIFICANT CHANGE UP (ref 0–0.99)
HCV AB SERPL-IMP: SIGNIFICANT CHANGE UP
SPECIMEN SOURCE: SIGNIFICANT CHANGE UP

## 2020-02-09 LAB
CULTURE RESULTS: SIGNIFICANT CHANGE UP
CULTURE RESULTS: SIGNIFICANT CHANGE UP
SPECIMEN SOURCE: SIGNIFICANT CHANGE UP
SPECIMEN SOURCE: SIGNIFICANT CHANGE UP

## 2020-03-19 ENCOUNTER — INPATIENT (INPATIENT)
Facility: HOSPITAL | Age: 75
LOS: 0 days | Discharge: HOME | End: 2020-03-20
Attending: INTERNAL MEDICINE | Admitting: INTERNAL MEDICINE
Payer: MEDICARE

## 2020-03-19 ENCOUNTER — EMERGENCY (EMERGENCY)
Facility: HOSPITAL | Age: 75
LOS: 0 days | Discharge: HOME | End: 2020-03-19
Admitting: INTERNAL MEDICINE

## 2020-03-19 VITALS
DIASTOLIC BLOOD PRESSURE: 88 MMHG | TEMPERATURE: 100 F | RESPIRATION RATE: 20 BRPM | HEART RATE: 125 BPM | SYSTOLIC BLOOD PRESSURE: 118 MMHG | OXYGEN SATURATION: 98 % | WEIGHT: 130.07 LBS

## 2020-03-19 DIAGNOSIS — Z74.01 BED CONFINEMENT STATUS: ICD-10-CM

## 2020-03-19 DIAGNOSIS — I26.99 OTHER PULMONARY EMBOLISM WITHOUT ACUTE COR PULMONALE: ICD-10-CM

## 2020-03-19 DIAGNOSIS — A41.9 SEPSIS, UNSPECIFIED ORGANISM: ICD-10-CM

## 2020-03-19 DIAGNOSIS — A08.11 ACUTE GASTROENTEROPATHY DUE TO NORWALK AGENT: ICD-10-CM

## 2020-03-19 DIAGNOSIS — I69.920 APHASIA FOLLOWING UNSPECIFIED CEREBROVASCULAR DISEASE: ICD-10-CM

## 2020-03-19 DIAGNOSIS — K52.9 NONINFECTIVE GASTROENTERITIS AND COLITIS, UNSPECIFIED: ICD-10-CM

## 2020-03-19 DIAGNOSIS — Z96.649 PRESENCE OF UNSPECIFIED ARTIFICIAL HIP JOINT: Chronic | ICD-10-CM

## 2020-03-19 DIAGNOSIS — C15.9 MALIGNANT NEOPLASM OF ESOPHAGUS, UNSPECIFIED: ICD-10-CM

## 2020-03-19 DIAGNOSIS — C85.90 NON-HODGKIN LYMPHOMA, UNSPECIFIED, UNSPECIFIED SITE: ICD-10-CM

## 2020-03-19 DIAGNOSIS — M10.9 GOUT, UNSPECIFIED: ICD-10-CM

## 2020-03-19 DIAGNOSIS — H26.9 UNSPECIFIED CATARACT: Chronic | ICD-10-CM

## 2020-03-19 DIAGNOSIS — I69.351 HEMIPLEGIA AND HEMIPARESIS FOLLOWING CEREBRAL INFARCTION AFFECTING RIGHT DOMINANT SIDE: ICD-10-CM

## 2020-03-19 DIAGNOSIS — R00.2 PALPITATIONS: ICD-10-CM

## 2020-03-19 DIAGNOSIS — N40.0 BENIGN PROSTATIC HYPERPLASIA WITHOUT LOWER URINARY TRACT SYMPTOMS: ICD-10-CM

## 2020-03-19 DIAGNOSIS — F32.9 MAJOR DEPRESSIVE DISORDER, SINGLE EPISODE, UNSPECIFIED: ICD-10-CM

## 2020-03-19 DIAGNOSIS — Z99.3 DEPENDENCE ON WHEELCHAIR: ICD-10-CM

## 2020-03-19 DIAGNOSIS — E87.2 ACIDOSIS: ICD-10-CM

## 2020-03-19 DIAGNOSIS — I42.9 CARDIOMYOPATHY, UNSPECIFIED: ICD-10-CM

## 2020-03-19 DIAGNOSIS — Z93.1 GASTROSTOMY STATUS: Chronic | ICD-10-CM

## 2020-03-19 DIAGNOSIS — Z93.1 GASTROSTOMY STATUS: ICD-10-CM

## 2020-03-19 DIAGNOSIS — Z92.21 PERSONAL HISTORY OF ANTINEOPLASTIC CHEMOTHERAPY: ICD-10-CM

## 2020-03-19 DIAGNOSIS — Z91.013 ALLERGY TO SEAFOOD: ICD-10-CM

## 2020-03-19 DIAGNOSIS — Z86.711 PERSONAL HISTORY OF PULMONARY EMBOLISM: ICD-10-CM

## 2020-03-19 DIAGNOSIS — Z20.828 CONTACT WITH AND (SUSPECTED) EXPOSURE TO OTHER VIRAL COMMUNICABLE DISEASES: ICD-10-CM

## 2020-03-19 PROBLEM — I63.9 CEREBRAL INFARCTION, UNSPECIFIED: Chronic | Status: ACTIVE | Noted: 2020-02-04

## 2020-03-19 PROBLEM — Z87.438 PERSONAL HISTORY OF OTHER DISEASES OF MALE GENITAL ORGANS: Chronic | Status: ACTIVE | Noted: 2020-02-05

## 2020-03-19 LAB
ALBUMIN SERPL ELPH-MCNC: 3.2 G/DL — LOW (ref 3.5–5.2)
ALP SERPL-CCNC: 65 U/L — SIGNIFICANT CHANGE UP (ref 30–115)
ALT FLD-CCNC: 35 U/L — SIGNIFICANT CHANGE UP (ref 0–41)
ANION GAP SERPL CALC-SCNC: 16 MMOL/L — HIGH (ref 7–14)
APPEARANCE UR: CLEAR — SIGNIFICANT CHANGE UP
APTT BLD: 27.8 SEC — SIGNIFICANT CHANGE UP (ref 27–39.2)
AST SERPL-CCNC: 24 U/L — SIGNIFICANT CHANGE UP (ref 0–41)
BASE EXCESS BLDV CALC-SCNC: -3.7 MMOL/L — LOW (ref -2–2)
BASOPHILS # BLD AUTO: 0.02 K/UL — SIGNIFICANT CHANGE UP (ref 0–0.2)
BASOPHILS NFR BLD AUTO: 0.3 % — SIGNIFICANT CHANGE UP (ref 0–1)
BILIRUB SERPL-MCNC: 0.8 MG/DL — SIGNIFICANT CHANGE UP (ref 0.2–1.2)
BILIRUB UR-MCNC: NEGATIVE — SIGNIFICANT CHANGE UP
BUN SERPL-MCNC: 25 MG/DL — HIGH (ref 10–20)
CA-I SERPL-SCNC: 1.11 MMOL/L — LOW (ref 1.12–1.3)
CALCIUM SERPL-MCNC: 8.4 MG/DL — LOW (ref 8.5–10.1)
CHLORIDE SERPL-SCNC: 100 MMOL/L — SIGNIFICANT CHANGE UP (ref 98–110)
CO2 SERPL-SCNC: 19 MMOL/L — SIGNIFICANT CHANGE UP (ref 17–32)
COLOR SPEC: YELLOW — SIGNIFICANT CHANGE UP
CREAT SERPL-MCNC: 1 MG/DL — SIGNIFICANT CHANGE UP (ref 0.7–1.5)
DIFF PNL FLD: NEGATIVE — SIGNIFICANT CHANGE UP
EOSINOPHIL # BLD AUTO: 0 K/UL — SIGNIFICANT CHANGE UP (ref 0–0.7)
EOSINOPHIL NFR BLD AUTO: 0 % — SIGNIFICANT CHANGE UP (ref 0–8)
FLU A RESULT: NEGATIVE — SIGNIFICANT CHANGE UP
FLU A RESULT: NEGATIVE — SIGNIFICANT CHANGE UP
FLUAV AG NPH QL: NEGATIVE — SIGNIFICANT CHANGE UP
FLUBV AG NPH QL: NEGATIVE — SIGNIFICANT CHANGE UP
GAS PNL BLDV: 132 MMOL/L — LOW (ref 136–145)
GAS PNL BLDV: SIGNIFICANT CHANGE UP
GLUCOSE SERPL-MCNC: 152 MG/DL — HIGH (ref 70–99)
GLUCOSE UR QL: NEGATIVE — SIGNIFICANT CHANGE UP
HCO3 BLDV-SCNC: 20 MMOL/L — LOW (ref 22–29)
HCT VFR BLD CALC: 33.8 % — LOW (ref 42–52)
HCT VFR BLDA CALC: 32.4 % — LOW (ref 34–44)
HGB BLD CALC-MCNC: 10.6 G/DL — LOW (ref 14–18)
HGB BLD-MCNC: 11.7 G/DL — LOW (ref 14–18)
IMM GRANULOCYTES NFR BLD AUTO: 1.1 % — HIGH (ref 0.1–0.3)
INR BLD: 1.46 RATIO — HIGH (ref 0.65–1.3)
KETONES UR-MCNC: NEGATIVE — SIGNIFICANT CHANGE UP
LACTATE BLDV-MCNC: 2.6 MMOL/L — HIGH (ref 0.5–1.6)
LACTATE BLDV-MCNC: 4.5 MMOL/L — HIGH (ref 0.5–1.6)
LEUKOCYTE ESTERASE UR-ACNC: NEGATIVE — SIGNIFICANT CHANGE UP
LYMPHOCYTES # BLD AUTO: 0.5 K/UL — LOW (ref 1.2–3.4)
LYMPHOCYTES # BLD AUTO: 7.1 % — LOW (ref 20.5–51.1)
MCHC RBC-ENTMCNC: 29.7 PG — SIGNIFICANT CHANGE UP (ref 27–31)
MCHC RBC-ENTMCNC: 34.6 G/DL — SIGNIFICANT CHANGE UP (ref 32–37)
MCV RBC AUTO: 85.8 FL — SIGNIFICANT CHANGE UP (ref 80–94)
MONOCYTES # BLD AUTO: 0.65 K/UL — HIGH (ref 0.1–0.6)
MONOCYTES NFR BLD AUTO: 9.2 % — SIGNIFICANT CHANGE UP (ref 1.7–9.3)
NEUTROPHILS # BLD AUTO: 5.81 K/UL — SIGNIFICANT CHANGE UP (ref 1.4–6.5)
NEUTROPHILS NFR BLD AUTO: 82.3 % — HIGH (ref 42.2–75.2)
NITRITE UR-MCNC: NEGATIVE — SIGNIFICANT CHANGE UP
NRBC # BLD: 0 /100 WBCS — SIGNIFICANT CHANGE UP (ref 0–0)
PCO2 BLDV: 30 MMHG — LOW (ref 41–51)
PH BLDV: 7.43 — SIGNIFICANT CHANGE UP (ref 7.26–7.43)
PH UR: 6.5 — SIGNIFICANT CHANGE UP (ref 5–8)
PLATELET # BLD AUTO: 204 K/UL — SIGNIFICANT CHANGE UP (ref 130–400)
PO2 BLDV: 47 MMHG — HIGH (ref 20–40)
POTASSIUM BLDV-SCNC: 3.3 MMOL/L — SIGNIFICANT CHANGE UP (ref 3.3–5.6)
POTASSIUM SERPL-MCNC: 4.2 MMOL/L — SIGNIFICANT CHANGE UP (ref 3.5–5)
POTASSIUM SERPL-SCNC: 4.2 MMOL/L — SIGNIFICANT CHANGE UP (ref 3.5–5)
PROT SERPL-MCNC: 5.6 G/DL — LOW (ref 6–8)
PROT UR-MCNC: SIGNIFICANT CHANGE UP
PROTHROM AB SERPL-ACNC: 16.8 SEC — HIGH (ref 9.95–12.87)
RBC # BLD: 3.94 M/UL — LOW (ref 4.7–6.1)
RBC # FLD: 15.2 % — HIGH (ref 11.5–14.5)
RSV RESULT: NEGATIVE — SIGNIFICANT CHANGE UP
RSV RNA RESP QL NAA+PROBE: NEGATIVE — SIGNIFICANT CHANGE UP
SAO2 % BLDV: 86 % — SIGNIFICANT CHANGE UP
SODIUM SERPL-SCNC: 135 MMOL/L — SIGNIFICANT CHANGE UP (ref 135–146)
SP GR SPEC: 1.02 — SIGNIFICANT CHANGE UP (ref 1.01–1.02)
UROBILINOGEN FLD QL: SIGNIFICANT CHANGE UP
WBC # BLD: 7.06 K/UL — SIGNIFICANT CHANGE UP (ref 4.8–10.8)
WBC # FLD AUTO: 7.06 K/UL — SIGNIFICANT CHANGE UP (ref 4.8–10.8)

## 2020-03-19 PROCEDURE — 93010 ELECTROCARDIOGRAM REPORT: CPT

## 2020-03-19 PROCEDURE — 74177 CT ABD & PELVIS W/CONTRAST: CPT | Mod: 26

## 2020-03-19 PROCEDURE — 71045 X-RAY EXAM CHEST 1 VIEW: CPT | Mod: 26

## 2020-03-19 PROCEDURE — 99285 EMERGENCY DEPT VISIT HI MDM: CPT | Mod: GC

## 2020-03-19 PROCEDURE — 71275 CT ANGIOGRAPHY CHEST: CPT | Mod: 26

## 2020-03-19 RX ORDER — FAMOTIDINE 10 MG/ML
40 INJECTION INTRAVENOUS
Refills: 0 | Status: DISCONTINUED | OUTPATIENT
Start: 2020-03-19 | End: 2020-03-20

## 2020-03-19 RX ORDER — SODIUM CHLORIDE 9 MG/ML
1000 INJECTION, SOLUTION INTRAVENOUS
Refills: 0 | Status: DISCONTINUED | OUTPATIENT
Start: 2020-03-19 | End: 2020-03-20

## 2020-03-19 RX ORDER — METRONIDAZOLE 500 MG
500 TABLET ORAL EVERY 8 HOURS
Refills: 0 | Status: DISCONTINUED | OUTPATIENT
Start: 2020-03-19 | End: 2020-03-20

## 2020-03-19 RX ORDER — ENOXAPARIN SODIUM 100 MG/ML
60 INJECTION SUBCUTANEOUS EVERY 12 HOURS
Refills: 0 | Status: DISCONTINUED | OUTPATIENT
Start: 2020-03-19 | End: 2020-03-20

## 2020-03-19 RX ORDER — COLCHICINE 0.6 MG
0.6 TABLET ORAL DAILY
Refills: 0 | Status: DISCONTINUED | OUTPATIENT
Start: 2020-03-19 | End: 2020-03-20

## 2020-03-19 RX ORDER — CEFEPIME 1 G/1
2000 INJECTION, POWDER, FOR SOLUTION INTRAMUSCULAR; INTRAVENOUS EVERY 8 HOURS
Refills: 0 | Status: DISCONTINUED | OUTPATIENT
Start: 2020-03-19 | End: 2020-03-20

## 2020-03-19 RX ORDER — CEFEPIME 1 G/1
2000 INJECTION, POWDER, FOR SOLUTION INTRAMUSCULAR; INTRAVENOUS ONCE
Refills: 0 | Status: COMPLETED | OUTPATIENT
Start: 2020-03-19 | End: 2020-03-19

## 2020-03-19 RX ORDER — DESMOPRESSIN ACETATE 0.1 MG/1
0.2 TABLET ORAL DAILY
Refills: 0 | Status: DISCONTINUED | OUTPATIENT
Start: 2020-03-19 | End: 2020-03-20

## 2020-03-19 RX ORDER — ATORVASTATIN CALCIUM 80 MG/1
20 TABLET, FILM COATED ORAL AT BEDTIME
Refills: 0 | Status: DISCONTINUED | OUTPATIENT
Start: 2020-03-19 | End: 2020-03-20

## 2020-03-19 RX ORDER — SODIUM CHLORIDE 9 MG/ML
2000 INJECTION, SOLUTION INTRAVENOUS ONCE
Refills: 0 | Status: COMPLETED | OUTPATIENT
Start: 2020-03-19 | End: 2020-03-19

## 2020-03-19 RX ORDER — CLOPIDOGREL BISULFATE 75 MG/1
75 TABLET, FILM COATED ORAL DAILY
Refills: 0 | Status: DISCONTINUED | OUTPATIENT
Start: 2020-03-19 | End: 2020-03-20

## 2020-03-19 RX ORDER — ACETAMINOPHEN 500 MG
975 TABLET ORAL ONCE
Refills: 0 | Status: COMPLETED | OUTPATIENT
Start: 2020-03-19 | End: 2020-03-19

## 2020-03-19 RX ORDER — ESCITALOPRAM OXALATE 10 MG/1
20 TABLET, FILM COATED ORAL DAILY
Refills: 0 | Status: DISCONTINUED | OUTPATIENT
Start: 2020-03-19 | End: 2020-03-20

## 2020-03-19 RX ORDER — SODIUM CHLORIDE 9 MG/ML
1000 INJECTION INTRAMUSCULAR; INTRAVENOUS; SUBCUTANEOUS ONCE
Refills: 0 | Status: COMPLETED | OUTPATIENT
Start: 2020-03-19 | End: 2020-03-19

## 2020-03-19 RX ORDER — METOPROLOL TARTRATE 50 MG
25 TABLET ORAL
Refills: 0 | Status: DISCONTINUED | OUTPATIENT
Start: 2020-03-19 | End: 2020-03-20

## 2020-03-19 RX ORDER — VANCOMYCIN HCL 1 G
1000 VIAL (EA) INTRAVENOUS ONCE
Refills: 0 | Status: COMPLETED | OUTPATIENT
Start: 2020-03-19 | End: 2020-03-19

## 2020-03-19 RX ORDER — TAMSULOSIN HYDROCHLORIDE 0.4 MG/1
0.4 CAPSULE ORAL AT BEDTIME
Refills: 0 | Status: DISCONTINUED | OUTPATIENT
Start: 2020-03-19 | End: 2020-03-20

## 2020-03-19 RX ADMIN — CEFEPIME 100 MILLIGRAM(S): 1 INJECTION, POWDER, FOR SOLUTION INTRAMUSCULAR; INTRAVENOUS at 10:15

## 2020-03-19 RX ADMIN — Medication 975 MILLIGRAM(S): at 10:00

## 2020-03-19 RX ADMIN — SODIUM CHLORIDE 2000 MILLILITER(S): 9 INJECTION, SOLUTION INTRAVENOUS at 10:00

## 2020-03-19 RX ADMIN — Medication 250 MILLIGRAM(S): at 10:45

## 2020-03-19 RX ADMIN — SODIUM CHLORIDE 1000 MILLILITER(S): 9 INJECTION INTRAMUSCULAR; INTRAVENOUS; SUBCUTANEOUS at 14:04

## 2020-03-19 NOTE — H&P ADULT - NSHPSOCIALHISTORY_GEN_ALL_CORE
Never smoker  Former social drinker  Never illicit drugs.     Is bedbound (uses wheelchair to get to chemo appointments), and non-verbal (Yes/No is NOT reliable with him)   Lives at home with wife who is primary care giver.

## 2020-03-19 NOTE — H&P ADULT - ATTENDING COMMENTS
pt seen and examined with pulm dr davis, I have read and agree with above exam and poa,    wife at bedisde    75 y/o male with PMH of: Bedbound, non-verbal at baseline, esophageal Ca s/p PEG tube, currently on chemo (last treatment Wed. Home "chemo bag" finished on Fri. Wife is unsure what chemo drugs he is on), CVA with aphasia (2018), NHL (dx in 2018, currently on observation), HTN, DLD, recently diagnosed PE on lovenox injections, hx of cardiomyopathy of unknown origin that resolved, as per wife, with recent nuclear stress test that was negative. He presents to ED for diffuse, non-bloody diarrhea X 1 day and tachycardia.     gastrenteritis probable norovirus  discuused with id. pulm  lactate improved    no iv abx   dc home fu covid -19 doubtful

## 2020-03-19 NOTE — ED PROVIDER NOTE - OBJECTIVE STATEMENT
74y M w/ PMH of esophageal ca, NHL on chemo, CVA w/ r. sided residual deficit + aphasia, s/p feeding tube, and gout presents with elevated HR. Per caretaker, pt has been having diarrhea for the past few days. Last night, started having elevated HR of 120s. No other symptoms.

## 2020-03-19 NOTE — ED PROVIDER NOTE - PMH
CVA (cerebral vascular accident)  with aphasia  Depression    Esophageal cancer  s/p PEG   Port placement on 2/10 for neoadjuvant therapy  Gout    History of BPH    Non Hodgkin's lymphoma  on observation

## 2020-03-19 NOTE — H&P ADULT - NSICDXPASTMEDICALHX_GEN_ALL_CORE_FT
PAST MEDICAL HISTORY:  CVA (cerebral vascular accident) with aphasia    Depression     Esophageal cancer on chemo. s/p PEG tube    Esophageal cancer s/p PEG   Port placement on 2/10 for neoadjuvant therapy    Gout     History of BPH     Non Hodgkin's lymphoma on observation

## 2020-03-19 NOTE — ED PROVIDER NOTE - CLINICAL SUMMARY MEDICAL DECISION MAKING FREE TEXT BOX
pt w/ hx NHL on chemo here for tachycardia, diarrhea. labs significant for elevated lactate w/ improved w/ fluids. cta chest, ctap negative for acute findings. will admit for observation given immunocompromised state, elevated lactate, c/f ability to stay adequately hydrated w/ diarrhea

## 2020-03-19 NOTE — ED PROVIDER NOTE - ATTENDING CONTRIBUTION TO CARE
75 yo male with pmh of esophageal cancer, non Hodgkins on chemo at [facility], CVA with right sided residual deficit with aphasia, has feeding tube, gout presents to the ER for palpitations/rapid HR since last night. Pt here with wife, who states pt with diarrhea for past few days. NO fever/cough/abdomen pain/CP/SOB/N/V/dysuria. Agree with PA management. Check labs, ekg, xray, urine and reassess.

## 2020-03-19 NOTE — ED PROVIDER NOTE - PHYSICAL EXAMINATION
CONSTITUTIONAL: Sleeping.  SKIN: warm, dry  HEAD: Normocephalic; atraumatic.  EYES: PERRL, EOMI, no conjunctival erythema  ENT: No nasal discharge; airway clear.  NECK: Supple; non tender.  CARD: S1, S2 normal; no murmurs, gallops, or rubs. Tachycardic  RESP: No wheezes, rales or rhonchi.  ABD: soft ntnd. feeding tube present.   EXT: Normal ROM.  No clubbing, cyanosis or edema.   LYMPH: No acute cervical adenopathy.  NEURO: AO x 0. Aphasic.  PSYCH: Cooperative, appropriate.

## 2020-03-19 NOTE — H&P ADULT - NSICDXPASTSURGICALHX_GEN_ALL_CORE_FT
PAST SURGICAL HISTORY:  Cataract     History of hip replacement     PEG (percutaneous endoscopic gastrostomy) status secondary to esophageal ca

## 2020-03-19 NOTE — H&P ADULT - ASSESSMENT
75 y/o male with PMH of: Bedbound, non-verbal at baseline, esophageal Ca s/p PEG tube, currently on chemo (last treatment Wed. Home "chemo bag" finished on Fri. Wife is unsure what chemo drugs he is on), CVA with aphasia (2018), NHL (dx in 2018, currently on observation), HTN, DLD, recently diagnosed PE on lovenox injections, hx of cardiomyopathy of unknown origin that resolved, as per wife, with recent nuclear stress test that was negative. He presents to ED for diffuse, non-bloody diarrhea X 1 day and tachycardia.     # Non-bloody diarrhea X 1 day - likely secondary to gastroenteritis   - sepsis present on admission. Tachycardia to 125. Lactate elevated to 4.5   - Lactate 4.5, which improved to 2.6 s/p 2L IVF bolus.   - CT a/p revealed: The colon and rectum are moderately distended with gas and fluid. There is slight bowel wall thickening in the region of the distal ileum. Inflammatory process such as gastroenteritis, should be considered.   - s/p vanc and cefepime in the ED.   - will continue maintenance fluids  - start broad spectrum antibiotics  - f/u AM CBC and lactate  - f/u ID consult    # Tachycardia - likely secondary to sepsis from gastroenteritis. Rule out COVID-19  - wife denies fever, SOB, or cough.  - CXR with no acute cardiopulmonary disease.   - CT angio negative for acute PE.   - WBC 7.06. Lymphocytes 0.50. Plt 204. No transaminitis. ANC 5.81.   - No recent travel or CoVid contacts (that patient is aware of, although recently in chemo suit)   - Flu A/B/RSV negative  - Isolation precautions. Limit amount of healthcare professional contact.   - f/u COVID19 swab (received by lab)  - f/u RVP (received by lab)   - f/u procalcitonin, ESR/CRP, ferritin, fibrinogen and LDH (ordered).    - Does not meet criteria to start hydroxychloroquine (CXR changes and hypoxic resp. failure requiring supplemental oxygen)  - Trend CBC and LFTs.   - f/u ID consult    # hx of PE  - currently on home lovenox injections   - continue therapeutic lovenox    # Esophageal Ca s/p PEG tube, currently on chemo (last treatment Wed. Home "chemo bag" finished on Fri. Wife is unsure what chemo drugs he is on)  - please obtain records from oncologist in AM  - outpatient follow up    # HTN - stable  - continue home meds    # DLD  - continue home meds    # CVA with aphasia (2018), bedbound/non-verbal at baseline.   - continue home meds    # NHL (dx in 2018, currently on observation)  - outpatient follow up.     # hx of cardiomyopathy of unknown origin that resolved, as per wife, with recent nuclear stress test that was negative.   - outpatient follow up.     DVT ppx: therapeutic lovenox  GI ppx: none  Diet: osmolite tube feeds  Bedrest  Dispo: Acute, from home. Wife states it is her top priority to get him discharged and home and soon as possible. I explained to her that we want to discharge patients too, but we need to do it safely. She expressed understanding.   FULL CODE 73 y/o male with PMH of: Bedbound, non-verbal at baseline, esophageal Ca s/p PEG tube, currently on chemo (last treatment Wed. Home "chemo bag" finished on Fri. Wife is unsure what chemo drugs he is on), CVA with aphasia (2018), NHL (dx in 2018, currently on observation), HTN, DLD, recently diagnosed PE on lovenox injections, hx of cardiomyopathy of unknown origin that resolved, as per wife, with recent nuclear stress test that was negative. He presents to ED for diffuse, non-bloody diarrhea X 1 day and tachycardia.     # Non-bloody diarrhea X 1 day - likely secondary to gastroenteritis   - sepsis present on admission. Tachycardia to 125. Lactate elevated to 4.5   - Lactate 4.5, which improved to 2.6 s/p 2L IVF bolus.   - CT a/p revealed: The colon and rectum are moderately distended with gas and fluid. There is slight bowel wall thickening in the region of the distal ileum. Inflammatory process such as gastroenteritis, should be considered.   - s/p vanc and cefepime in the ED.   - will continue maintenance fluids  - start broad spectrum antibiotics  - f/u c.diff, stool PCR  - f/u AM CBC and lactate  - f/u ID consult    # Tachycardia - likely secondary to sepsis from gastroenteritis. Rule out COVID-19  - wife denies fever, SOB, or cough.  - CXR with no acute cardiopulmonary disease.   - CT angio negative for acute PE.   - WBC 7.06. Lymphocytes 0.50. Plt 204. No transaminitis. ANC 5.81.   - No recent travel or CoVid contacts (that patient is aware of, although recently in chemo suit)   - Flu A/B/RSV negative  - Isolation precautions. Limit amount of healthcare professional contact.   - f/u COVID19 swab (received by lab)  - f/u RVP (received by lab)   - f/u procalcitonin, ESR/CRP, ferritin, fibrinogen and LDH (ordered).    - Does not meet criteria to start hydroxychloroquine (CXR changes and hypoxic resp. failure requiring supplemental oxygen)  - Trend CBC and LFTs.   - f/u ID consult    # hx of PE  - currently on home lovenox injections   - continue therapeutic lovenox    # Esophageal Ca s/p PEG tube, currently on chemo (last treatment Wed. Home "chemo bag" finished on Fri. Wife is unsure what chemo drugs he is on)  - please obtain records from oncologist in AM  - outpatient follow up    # HTN - stable  - continue home meds    # DLD  - continue home meds    # CVA with aphasia (2018), bedbound/non-verbal at baseline.   - continue home meds    # NHL (dx in 2018, currently on observation)  - outpatient follow up.     # hx of cardiomyopathy of unknown origin that resolved, as per wife, with recent nuclear stress test that was negative.   - outpatient follow up.     DVT ppx: therapeutic lovenox  GI ppx: none  Diet: osmolite tube feeds  Bedrest  Dispo: Acute, from home. Wife states it is her top priority to get him discharged and home and soon as possible. I explained to her that we want to discharge patients too, but we need to do it safely. She expressed understanding.   FULL CODE

## 2020-03-19 NOTE — H&P ADULT - NSHPPHYSICALEXAM_GEN_ALL_CORE
GENERAL: Elderly M in NAD, no signs of respiratory distress, non-verbal  HEAD: Atraumatic  NECK: Supple  CHEST/LUNG: Clear to auscultation bilaterally; No wheeze or crackles  HEART: S1, S2; RRR; No murmurs, rubs, or gallops  ABDOMEN: BS+; Soft, Non-tender, Non-distended. PEG tube in place - no surrounding erythema.   EXTREMITIES:  2+ Peripheral Pulses, No clubbing, cyanosis, or edema  PSYCH: AAOx0. Non-verbal  NEUROLOGY: non-focal  SKIN: No rashes or lesions

## 2020-03-19 NOTE — H&P ADULT - HISTORY OF PRESENT ILLNESS
Intravenous PRN Jenae Hogue MD        cefOXitin (MEFOXIN) 2 g in dextrose 5% 50 mL (mini-bag)  2 g Intravenous On Call to [address]  mL/hr at 01/06/20 2030 2 g at 01/06/20 2030    oxytocin (PITOCIN) 30 units in 500 mL infusion  1 ky-units/min Intravenous Continuous Jenae Hogue MD        lactated ringers infusion   Intravenous Continuous Jenae Hogue  mL/hr at 01/06/20 1420      sodium chloride flush 0.9 % injection 10 mL  10 mL Intravenous 2 times per day Jenae Hogue MD        sodium chloride flush 0.9 % injection 10 mL  10 mL Intravenous PRN Jenae Hogue MD   10 mL at 01/06/20 0116    lidocaine PF 1 % injection 30 mL  30 mL Other PRN Jenae Hogue MD        nalbuphine (NUBAIN) injection 10 mg  10 mg Intravenous Q2H PRN Jenae Hogue MD        nitrous oxide 50% inhalation 1 each  1 each Inhalation Continuous PRN Jenae Hogue MD        acetaminophen (TYLENOL) tablet 650 mg  650 mg Oral Q4H PRN Jenae Hogue MD   650 mg at 01/06/20 1157    oxytocin (PITOCIN) 30 units in 500 mL infusion  1 ky-units/min Intravenous Continuous Jenae Hogue MD   Stopped at 01/06/20 1802    ondansetron (ZOFRAN) injection 4 mg  4 mg Intravenous Q6H PRN Jenae Hogue MD   4 mg at 01/06/20 1649    oxytocin (PITOCIN) 30 units in 500 mL infusion  1 ky-units/min Intravenous Continuous PRN Jenae Hogue MD        methylergonovine (METHERGINE) injection 200 mcg  200 mcg Intramuscular PRN Jenae Hogue MD        carboprost (HEMABATE) injection 250 mcg  250 mcg Intramuscular PRN Jenae Hogue MD        misoprostol (CYTOTEC) tablet 900 mcg  900 mcg Rectal PRN Jenae Dieter, MD       [employer] witch hazel-glycerin (TUCKS) pad   Topical PRN Jenae Hogue MD        benzocaine-menthol (DERMOPLAST) 20-0.5 % spray   Topical PRN Jenae Hogue MD           Allergies:  No Known Allergies    Problem List:    Patient Active Problem List   Diagnosis Code    Pregnancy in multigravida Z34.80    Pelvic pain affecting pregnancy in first trimester, antepartum O26.891, R10.2    Abdominal pain R10.9    Vaginal bleeding during pregnancy O46.90    Encounter for induction of labor Z34.90       Past Medical History:        Diagnosis Date    Abnormal Pap smear of cervix     lee 2016    Cervical cancer ([address]     Heart murmur     Mitral valve regurgitation     Thyroid disease        Past Surgical History:        Procedure Laterality Date    BUNIONECTOMY      x3    FOOT SURGERY      [facility]  12/2017       Social History:    Social History     Tobacco Use    Smoking status: Never Smoker    Smokeless tobacco: Never Used   Substance Use Topics    Alcohol use: Not Currently     Frequency: Monthly or less     Binge frequency: Never     Comment: socially mostly holidays                                Counseling given: Not Answered      Vital Signs (Current):   Vitals:    01/06/20 1549 01/06/20 1622 01/06/20 1717 01/06/20 1831   BP: 121/75 112/67 120/73 118/68   Pulse: 61 73 61 79   Resp: 12 14 14 12   Temp:  36.8 °C (98.2 °F)     TempSrc:  Oral     Weight:       Height:                                                  BP Readings from Last 3 Encounters:   01/06/20 118/68   11/08/19 126/85   11/02/19 (!) 105/51       NPO Status: Time of last liquid consumption: 1900                        Time of last solid consumption: 1700                        Date of last liquid consumption: 01/05/20                        Date of last solid food consumption: 01/05/20    BMI:   Wt Readings from Last 3 Encounters:   01/05/20 154 lb (69.9 kg)   11/30/19 147 lb (66.7 kg)   11/08/19 147 lb (66.7 kg)     Body mass index is 24.86 kg/m².     CBC:   Lab Results   Component Value Date    WBC 5.0 01/05/2020    RBC 3.51 01/05/2020    RBC 3.88 05/31/2012    HGB 9.9 01/05/2020    HCT 32.2 01/05/2020    MCV 91.7 01/05/2020    RDW 12.8 01/05/2020     01/05/2020     05/31/2012       CMP:   Lab Results   Component Value Date 75 y/o male  PMHx: Bedbound, non-verbal at baseline, esophageal Ca s/p PEG tube, currently on chemo (last treatment Wed. Home "chemo bag" finished on Fri. Wife is unsure what chemo drugs he is on), CVA with aphasia (2018), NHL (dx in 2018, currently on observation), HTN, DLD, recently diagnosed PE on lovenox injections, hx of cardiomyopathy of unknown origin that resolved, as per wife, with recent nuclear stress test that was negative.    CC: Diffuse, non-bloody diarrhea X 1 day    History was obtained from patients wife (Renu, [phone]) at bedside who is his primary care giver. Patient is non-verbal and bedbound at baseline (uses wheelchair to get to and from clinic appointments). Wife says she checks his vital signs routinely at home and noticed yesterday (3/18) he was a little tachycardic, (low 100s). Overnight he had non-bloody diarrhea in the bed, which has continued all day. She spoke with her PCP over the phone who initially told her to give him more hydration. So she was giving extra water through the PEG tube. She also gave him imodium several times. She states he has had gastroenteritis in the past, which was successfully treatment with antibiotics and resolved. He then became tachycardic to 130s-140s so she called the PCP back, and told her to come to the ED. They have been self-quarantining at home for past 3 weeks (other than chemo appointments) and no one has come or gone through their home. She was very reluctant to take him to the ED today because of concern of COVID-19.     In ED, /88, . Temp 99.8. Sating 98% on RA. CXR with no acute cardiopulmonary disease. WBC 7.06. Lymphocytes 0.50. Plt 204. No transaminitis. ANC 5.81. Lactate 4.5, which improved to 2.6 s/p 2L IVF bolus. CT angio negative for acute PE. CT a/p revealed: The colon and rectum are moderately distended with gas and fluid. There is slight bowel wall thickening in the region of the distal ileum. Inflammatory process such as gastroenteritis, should be considered. s/p vanc and cefepime in the ED. To be admitted to medicine for further workup and management.

## 2020-03-19 NOTE — H&P ADULT - NSHPLABSRESULTS_GEN_ALL_CORE
11.7   7.06  )-----------( 204      ( 19 Mar 2020 10:20 )             33.8       03-19    135  |  100  |  25<H>  ----------------------------<  152<H>  4.2   |  19  |  1.0    Ca    8.4<L>      19 Mar 2020 10:20    TPro  5.6<L>  /  Alb  3.2<L>  /  TBili  0.8  /  DBili  x   /  AST  24  /  ALT  35  /  AlkPhos  65  03-19      PT/INR - ( 19 Mar 2020 10:20 )   PT: 16.80 sec;   INR: 1.46 ratio         PTT - ( 19 Mar 2020 10:20 )  PTT:27.8 sec    RADIOLOGY:  < from: CT Abdomen and Pelvis w/ IV Cont (03.19.20 @ 17:11) >      IMPRESSION: The colon and rectum are moderately distended with gas and fluid. There is slight bowel wall thickening in the region of the distal ileum (2/35). Inflammatory process such asgastroenteritis, should be considered.    < end of copied text >    < from: CT Angio Chest w/ IV Cont (03.19.20 @ 17:10) >        IMPRESSION:    No evidence of pulmonary embolism.          < end of copied text >

## 2020-03-19 NOTE — ED ADULT NURSE NOTE - OBJECTIVE STATEMENT
Pt wife reports pt HR being high 120-140s, last chemo was last Wednesday, and he went home with a chemo bag that was removed on Saturday. Wife reports SOB last night, and was not himself. pt is not SOB right now, but is lethargic. Pt denies SOB, chest pain, fevers, chills.

## 2020-03-20 ENCOUNTER — TRANSCRIPTION ENCOUNTER (OUTPATIENT)
Age: 75
End: 2020-03-20

## 2020-03-20 VITALS
SYSTOLIC BLOOD PRESSURE: 106 MMHG | DIASTOLIC BLOOD PRESSURE: 67 MMHG | TEMPERATURE: 97 F | RESPIRATION RATE: 18 BRPM | HEART RATE: 101 BPM

## 2020-03-20 LAB
ALBUMIN SERPL ELPH-MCNC: 2.2 G/DL — LOW (ref 3.5–5.2)
ALP SERPL-CCNC: 50 U/L — SIGNIFICANT CHANGE UP (ref 30–115)
ALT FLD-CCNC: 25 U/L — SIGNIFICANT CHANGE UP (ref 0–41)
ANION GAP SERPL CALC-SCNC: 12 MMOL/L — SIGNIFICANT CHANGE UP (ref 7–14)
AST SERPL-CCNC: 23 U/L — SIGNIFICANT CHANGE UP (ref 0–41)
BASOPHILS # BLD AUTO: 0.01 K/UL — SIGNIFICANT CHANGE UP (ref 0–0.2)
BASOPHILS NFR BLD AUTO: 0.2 % — SIGNIFICANT CHANGE UP (ref 0–1)
BILIRUB SERPL-MCNC: 0.5 MG/DL — SIGNIFICANT CHANGE UP (ref 0.2–1.2)
BUN SERPL-MCNC: 29 MG/DL — HIGH (ref 10–20)
CALCIUM SERPL-MCNC: 7.8 MG/DL — LOW (ref 8.5–10.1)
CHLORIDE SERPL-SCNC: 104 MMOL/L — SIGNIFICANT CHANGE UP (ref 98–110)
CO2 SERPL-SCNC: 19 MMOL/L — SIGNIFICANT CHANGE UP (ref 17–32)
CREAT SERPL-MCNC: 0.8 MG/DL — SIGNIFICANT CHANGE UP (ref 0.7–1.5)
CULTURE RESULTS: NO GROWTH — SIGNIFICANT CHANGE UP
EOSINOPHIL # BLD AUTO: 0.07 K/UL — SIGNIFICANT CHANGE UP (ref 0–0.7)
EOSINOPHIL NFR BLD AUTO: 1.6 % — SIGNIFICANT CHANGE UP (ref 0–8)
ERYTHROCYTE [SEDIMENTATION RATE] IN BLOOD: 107 MM/HR — HIGH (ref 0–10)
FIBRINOGEN PPP-MCNC: 678 MG/DL — HIGH (ref 204.4–570.6)
GLUCOSE SERPL-MCNC: 112 MG/DL — HIGH (ref 70–99)
HCT VFR BLD CALC: 26.2 % — LOW (ref 42–52)
HGB BLD-MCNC: 8.9 G/DL — LOW (ref 14–18)
IMM GRANULOCYTES NFR BLD AUTO: 0.7 % — HIGH (ref 0.1–0.3)
LACTATE SERPL-SCNC: 2.1 MMOL/L — HIGH (ref 0.7–2)
LDH SERPL L TO P-CCNC: 210 U/L — SIGNIFICANT CHANGE UP (ref 50–242)
LYMPHOCYTES # BLD AUTO: 0.81 K/UL — LOW (ref 1.2–3.4)
LYMPHOCYTES # BLD AUTO: 18 % — LOW (ref 20.5–51.1)
MAGNESIUM SERPL-MCNC: 1.7 MG/DL — LOW (ref 1.8–2.4)
MCHC RBC-ENTMCNC: 29.1 PG — SIGNIFICANT CHANGE UP (ref 27–31)
MCHC RBC-ENTMCNC: 34 G/DL — SIGNIFICANT CHANGE UP (ref 32–37)
MCV RBC AUTO: 85.6 FL — SIGNIFICANT CHANGE UP (ref 80–94)
MONOCYTES # BLD AUTO: 0.77 K/UL — HIGH (ref 0.1–0.6)
MONOCYTES NFR BLD AUTO: 17.1 % — HIGH (ref 1.7–9.3)
NEUTROPHILS # BLD AUTO: 2.82 K/UL — SIGNIFICANT CHANGE UP (ref 1.4–6.5)
NEUTROPHILS NFR BLD AUTO: 62.4 % — SIGNIFICANT CHANGE UP (ref 42.2–75.2)
NRBC # BLD: 0 /100 WBCS — SIGNIFICANT CHANGE UP (ref 0–0)
PHOSPHATE SERPL-MCNC: 2.4 MG/DL — SIGNIFICANT CHANGE UP (ref 2.1–4.9)
PLATELET # BLD AUTO: 123 K/UL — LOW (ref 130–400)
POTASSIUM SERPL-MCNC: 3.7 MMOL/L — SIGNIFICANT CHANGE UP (ref 3.5–5)
POTASSIUM SERPL-SCNC: 3.7 MMOL/L — SIGNIFICANT CHANGE UP (ref 3.5–5)
PROT SERPL-MCNC: 4 G/DL — LOW (ref 6–8)
RBC # BLD: 3.06 M/UL — LOW (ref 4.7–6.1)
RBC # FLD: 15.1 % — HIGH (ref 11.5–14.5)
SODIUM SERPL-SCNC: 135 MMOL/L — SIGNIFICANT CHANGE UP (ref 135–146)
SPECIMEN SOURCE: SIGNIFICANT CHANGE UP
WBC # BLD: 4.51 K/UL — LOW (ref 4.8–10.8)
WBC # FLD AUTO: 4.51 K/UL — LOW (ref 4.8–10.8)

## 2020-03-20 RX ORDER — MAGNESIUM SULFATE 500 MG/ML
2 VIAL (ML) INJECTION ONCE
Refills: 0 | Status: COMPLETED | OUTPATIENT
Start: 2020-03-20 | End: 2020-03-20

## 2020-03-20 RX ORDER — FAMOTIDINE 10 MG/ML
5 INJECTION INTRAVENOUS
Qty: 0 | Refills: 0 | DISCHARGE

## 2020-03-20 RX ADMIN — ESCITALOPRAM OXALATE 20 MILLIGRAM(S): 10 TABLET, FILM COATED ORAL at 11:10

## 2020-03-20 RX ADMIN — Medication 0.6 MILLIGRAM(S): at 11:10

## 2020-03-20 RX ADMIN — CLOPIDOGREL BISULFATE 75 MILLIGRAM(S): 75 TABLET, FILM COATED ORAL at 11:10

## 2020-03-20 RX ADMIN — SODIUM CHLORIDE 75 MILLILITER(S): 9 INJECTION, SOLUTION INTRAVENOUS at 11:09

## 2020-03-20 RX ADMIN — ATORVASTATIN CALCIUM 20 MILLIGRAM(S): 80 TABLET, FILM COATED ORAL at 00:27

## 2020-03-20 RX ADMIN — DESMOPRESSIN ACETATE 0.2 MILLIGRAM(S): 0.1 TABLET ORAL at 11:11

## 2020-03-20 RX ADMIN — Medication 25 MILLIGRAM(S): at 05:21

## 2020-03-20 RX ADMIN — CEFEPIME 100 MILLIGRAM(S): 1 INJECTION, POWDER, FOR SOLUTION INTRAMUSCULAR; INTRAVENOUS at 11:12

## 2020-03-20 RX ADMIN — CEFEPIME 100 MILLIGRAM(S): 1 INJECTION, POWDER, FOR SOLUTION INTRAMUSCULAR; INTRAVENOUS at 05:21

## 2020-03-20 RX ADMIN — CEFEPIME 100 MILLIGRAM(S): 1 INJECTION, POWDER, FOR SOLUTION INTRAMUSCULAR; INTRAVENOUS at 00:26

## 2020-03-20 RX ADMIN — ENOXAPARIN SODIUM 60 MILLIGRAM(S): 100 INJECTION SUBCUTANEOUS at 05:21

## 2020-03-20 RX ADMIN — Medication 100 MILLIGRAM(S): at 00:26

## 2020-03-20 RX ADMIN — SODIUM CHLORIDE 75 MILLILITER(S): 9 INJECTION, SOLUTION INTRAVENOUS at 00:27

## 2020-03-20 RX ADMIN — TAMSULOSIN HYDROCHLORIDE 0.4 MILLIGRAM(S): 0.4 CAPSULE ORAL at 00:27

## 2020-03-20 RX ADMIN — Medication 50 GRAM(S): at 11:10

## 2020-03-20 RX ADMIN — Medication 100 MILLIGRAM(S): at 05:20

## 2020-03-20 NOTE — CONSULT NOTE ADULT - ASSESSMENT
ASSESSMENT  75 y/o male Bedbound, non-verbal at baseline, esophageal Ca s/p PEG tube, currently on chemo (last treatment Wed. Home "chemo bag" finished on Fri. Wife is unsure what chemo drugs he is on), CVA with aphasia (2018), NHL (dx in 2018, currently on observation), HTN, DLD, recently diagnosed PE on lovenox injections, hx of cardiomyopathy of unknown origin that resolved, as per wife, with recent nuclear stress test that was negative presenting with Diffuse, non-bloody diarrhea X 1 day        IMPRESSION  #Diarrhea, nonbloody    WBC 5 ABS lymph 0.50    CTAP The colon and rectum are moderately distended with gas and fluid. There is slight bowel wall thickening in the region of the distal ileum    CT Chest no GGOs  #Doubt COVID-19   #Port in place  #Sepsis ruled out on admission , afebrile since admission  #Lactic acidosis  Lactate: 4.5 mmoL/L (03-19-20 @ 10:58)  #CT Mild dependent atelectatic changes at the right lung base    RECOMMENDATIONS  - SEND GI PCR Panel, rule out norovirus or other viral etiology. if results as POSITIVE then D/C ABX  - ok to continue cefepime/flagyl for now  - trend lactate  - doubt COVID-19 , f/u test  - F/u blood cultures    Spectra [id number]

## 2020-03-20 NOTE — CHART NOTE - NSCHARTNOTEFT_GEN_A_CORE
patient's wife was angry as she did not understanding why the patient was admitted to COVID unit in patient who is on chemotherapy, tried explaining but she kept requesting that I should contact Dr. Mckayla chaves, so Called Dr. Mckayla chaves a message was left with call back number. Dr. Tirado is aware of situation

## 2020-03-20 NOTE — CONSULT NOTE ADULT - SUBJECTIVE AND OBJECTIVE BOX
JODI GODINEZ  74y, Male  Allergy: No Known Drug Allergies  Originally Entered as [HIVES] reaction to [shrimp] (Unknown)  shellfish (Other)      CHIEF COMPLAINT: Non-bloody Diarrhea - Rule out COVID (19 Mar 2020 20:55)      LOS  1d    HPI:  75 y/o male  PMHx: Bedbound, non-verbal at baseline, esophageal Ca s/p PEG tube, currently on chemo (last treatment Wed. Home "chemo bag" finished on Fri. Wife is unsure what chemo drugs he is on), CVA with aphasia (2018), NHL (dx in 2018, currently on observation), HTN, DLD, recently diagnosed PE on lovenox injections, hx of cardiomyopathy of unknown origin that resolved, as per wife, with recent nuclear stress test that was negative.    CC: Diffuse, non-bloody diarrhea X 1 day    History was obtained from patients wife (Renu, [phone]) at bedside who is his primary care giver. Patient is non-verbal and bedbound at baseline (uses wheelchair to get to and from clinic appointments). Wife says she checks his vital signs routinely at home and noticed yesterday (3/18) he was a little tachycardic, (low 100s). Overnight he had non-bloody diarrhea in the bed, which has continued all day. She spoke with her PCP over the phone who initially told her to give him more hydration. So she was giving extra water through the PEG tube. She also gave him imodium several times. She states he has had gastroenteritis in the past, which was successfully treatment with antibiotics and resolved. He then became tachycardic to 130s-140s so she called the PCP back, and told her to come to the ED. They have been self-quarantining at home for past 3 weeks (other than chemo appointments) and no one has come or gone through their home. She was very reluctant to take him to the ED today because of concern of COVID-19.     In ED, /88, . Temp 99.8. Sating 98% on RA. CXR with no acute cardiopulmonary disease. WBC 7.06. Lymphocytes 0.50. Plt 204. No transaminitis. ANC 5.81. Lactate 4.5, which improved to 2.6 s/p 2L IVF bolus. CT angio negative for acute PE. CT a/p revealed: The colon and rectum are moderately distended with gas and fluid. There is slight bowel wall thickening in the region of the distal ileum. Inflammatory process such as gastroenteritis, should be considered. s/p vanc and cefepime in the ED. To be admitted to medicine for further workup and management. (19 Mar 2020 20:55)    PAST MEDICAL & SURGICAL HISTORY:  Esophageal cancer: on chemo. s/p PEG tube  Gout  Depression  History of BPH  CVA (cerebral vascular accident): with aphasia  Esophageal cancer: s/p PEG   Port placement on 2/10 for neoadjuvant therapy  Non Hodgkin's lymphoma: on observation  PEG (percutaneous endoscopic gastrostomy) status: secondary to esophageal ca  History of hip replacement  Cataract      FAMILY HISTORY  FH: colon cancer  No pertinent family history in first degree relatives      SOCIAL HISTORY  Social History:  Never smoker  Former social drinker  Never illicit drugs.     Is bedbound (uses wheelchair to get to chemo appointments), and non-verbal (Yes/No is NOT reliable with him)   Lives at home with wife who is primary care giver. (19 Mar 2020 20:55)        ROS  unable to obtain history secondary to patient's mental status and/or sedation     VITALS:  T(F): 98.1, Max: 99.8 (20 @ 08:59)  HR: 105  BP: 134/82  RR: 18Vital Signs Last 24 Hrs  T(C): 36.7 (20 Mar 2020 06:06), Max: 37.7 (19 Mar 2020 08:59)  T(F): 98.1 (20 Mar 2020 06:06), Max: 99.8 (19 Mar 2020 08:59)  HR: 105 (20 Mar 2020 06:06) (100 - 125)  BP: 134/82 (20 Mar 2020 06:06) (98/66 - 134/82)  BP(mean): 74 (19 Mar 2020 11:23) (74 - 74)  RR: 18 (20 Mar 2020 06:06) (18 - 20)  SpO2: 95% (20 Mar 2020 06:06) (95% - 99%)    PHYSICAL EXAM:  Gen: chronically ill appearing NAD, resting in bed  HEENT: Normocephalic, atraumatic  Neck: supple, no lymphadenopathy  CV: Regular rate & regular rhythm  Lungs: decreased BS at bases, no fremitus  Abdomen: Soft, BS present PEG  Ext: Warm, well perfused  Neuro: non focal, awake  Skin: no rash, no erythema  Lines: no phlebitis, port     TESTS & MEASUREMENTS:                        11.7   7.06  )-----------( 204      ( 19 Mar 2020 10:20 )             33.8         135  |  100  |  25<H>  ----------------------------<  152<H>  4.2   |  19  |  1.0    Ca    8.4<L>      19 Mar 2020 10:20    TPro  5.6<L>  /  Alb  3.2<L>  /  TBili  0.8  /  DBili  x   /  AST  24  /  ALT  35  /  AlkPhos  65      eGFR if Non African American: 74 mL/min/1.73M2 (20 @ 10:20)  eGFR if : 86 mL/min/1.73M2 (20 @ 10:20)    LIVER FUNCTIONS - ( 19 Mar 2020 10:20 )  Alb: 3.2 g/dL / Pro: 5.6 g/dL / ALK PHOS: 65 U/L / ALT: 35 U/L / AST: 24 U/L / GGT: x           Urinalysis Basic - ( 19 Mar 2020 18:25 )    Color: Yellow / Appearance: Clear / S.020 / pH: x  Gluc: x / Ketone: Negative  / Bili: Negative / Urobili: <2 mg/dL   Blood: x / Protein: Trace / Nitrite: Negative   Leuk Esterase: Negative / RBC: x / WBC x   Sq Epi: x / Non Sq Epi: x / Bacteria: x          Blood Gas Venous - Lactate: 2.6 mmoL/L (20 @ 11:52)  Blood Gas Venous - Lactate: 4.5 mmoL/L (20 @ 10:58)      INFECTIOUS DISEASES TESTING  Hepatitis C Virus Interpretation: Nonreact (20 @ 05:47)      RADIOLOGY & ADDITIONAL TESTS:  I have personally reviewed the last Chest xray  CXR      CT  CT Abdomen and Pelvis w/ IV Cont:   EXAM:  CT ABDOMEN AND PELVIS IC            PROCEDURE DATE:  2020            INTERPRETATION:  REASON FOR EXAM / CLINICAL STATEMENT: Fever, Tachycardia, palpitations, diarrhea. PMH of esophageal ca, NHL on chemo, CVA, s/p feeding tube, and gout    TECHNIQUE: Contiguous axial CT images were obtained from the lower chest to the pubic symphysis with intravenous contrast.   Reformatted images in the coronal and sagittal planes were acquired.    COMPARISON CT: CT scan of the abdomen pelvis date    OTHER STUDIES USED FOR CORRELATION: None.         FINDINGS    LOWER CHEST: Mild dependent atelectatic changes at the right lung base, otherwise the lung bases are clear. No pleural or pericardial effusion.    HEPATIC: The liver is normal in appearance with no evidence of mass or bile duct dilatation.      BILIARY: No calcified gallstones are noted.    SPLEEN: Unremarkable.        PANCREAS: The pancreas is normal in size and configuration. The previously noted cystic structure in the mid body of the pancreas, which may represent an IPMN, is stable.    ADRENAL GLANDS: Unremarkable.        KIDNEYS: No evidence of hydronephrosis, calcified stones, or solid mass. Renal cortical scarring is again noted. Several small renal cysts, and a few subcentimeter hypodense cortical lesions, too small to characterize, are noted bilaterally.        ABDOMINOPELVIC NODES: Unremarkable.    PELVIC ORGANS: No evidence of pelvic mass, lymphadenopathy, or fluid collection..        PERITONEUM/MESENTERY/BOWEL: The colon and rectum are moderately distended with gas and fluid. There is slight bowel wall thickening in the region of the distal ileum (2/35). Inflammatory process such as gastroenteritis, should be considered.    A gastrostomy is noted in the mid upper abdomen with the retention device within the gastric body. Sigmoid diverticulosis without evidence of diverticulitis.  No evidence of bowel obstruction or ascites within the abdomen or pelvis. The appendix is not identified.    BONES/SOFT TISSUES: Degenerative changes of the spine are noted. Status post  total right hip replacement, with streak artifact partially obscuring the lower pelvis..     OTHER: No evidence of abdominal aortic aneurysm. Occlusion of right external iliac artery, with patent femoral-femoral bypass graft, is again noted.      IMPRESSION: The colon and rectum are moderately distended with gas and fluid. There is slight bowel wall thickening in the region of the distal ileum (2/35). Inflammatory process such asgastroenteritis, should be considered.                    SHIRA ORTEGA M.D., ATTENDING RADIOLOGIST  This document has been electronically signed. Mar 19 2020  6:02PM             (20 @ 17:11)      CARDIOLOGY TESTING  12 Lead ECG:   Ventricular Rate 116 BPM    Atrial Rate 116 BPM    P-R Interval 134 ms    QRS Duration 82 ms    Q-T Interval 322 ms    QTC Calculation(Bezet) 447 ms    P Axis 48 degrees    R Axis 12 degrees    T Axis 25 degrees    Diagnosis Line Sinus tachycardia  Possible Left atrial enlargement  Left ventricular hypertrophy  Inferior infarct , age undetermined  Abnormal ECG    Confirmed by Fidencio Martinez ([id number]) on 3/19/2020 10:24:11 AM (20 @ 09:04)      MEDICATIONS  atorvastatin 20  cefepime   IVPB 2000  clopidogrel Tablet 75  colchicine 0.6  desmopressin 0.2  enoxaparin Injectable 60  escitalopram 20  famotidine   Suspension 40  lactated ringers. 1000  metoprolol tartrate 25  metroNIDAZOLE  IVPB 500  tamsulosin 0.4      Weight  Weight (kg): 59 (20 @ 08:59)    ANTIBIOTICS:  cefepime   IVPB 2000 milliGRAM(s) IV Intermittent every 8 hours  metroNIDAZOLE  IVPB 500 milliGRAM(s) IV Intermittent every 8 hours      ALLERGIES:  No Known Drug Allergies  Originally Entered as [HIVES] reaction to [shrimp] (Unknown)  shellfish (Other)

## 2020-03-20 NOTE — DISCHARGE NOTE PROVIDER - NSDCMRMEDTOKEN_GEN_ALL_CORE_FT
atorvastatin 20 mg oral tablet: 1 tab(s) orally once a day  colchicine 0.6 mg oral tablet: 1 tab(s) orally once a day  desmopressin 0.2 mg oral tablet: orally once a day  escitalopram 20 mg oral tablet: 1 tab(s) orally once a day  Lovenox 60 mg/0.6 mL injectable solution: 60 unit(s) injectable 2 times a day  metoprolol tartrate 25 mg oral tablet: 1 tab(s) orally 2 times a day  Plavix 75 mg oral tablet: 1 tab(s) orally once a day  tamsulosin 0.4 mg oral capsule: 1 cap(s) orally once a day

## 2020-03-20 NOTE — CONSULT NOTE ADULT - SUBJECTIVE AND OBJECTIVE BOX
JODI GODINEZ  MRN-[id number]    HISTORY OF PRESENT ILLNESS:  HPI:  73 y/o male  PMHx: Bedbound, non-verbal at baseline, esophageal Ca s/p PEG tube, currently on chemo (last treatment Wed. Home "chemo bag" finished on Fri. Wife is unsure what chemo drugs he is on), CVA with aphasia (2018), NHL (dx in 2018, currently on observation), HTN, DLD, recently diagnosed PE on lovenox injections, hx of cardiomyopathy of unknown origin that resolved, as per wife, with recent nuclear stress test that was negative.    CC: Diffuse, non-bloody diarrhea X 1 day    History was obtained from patients wife (Renu, [phone]) at bedside who is his primary care giver. Patient is non-verbal and bedbound at baseline (uses wheelchair to get to and from clinic appointments). Wife says she checks his vital signs routinely at home and noticed yesterday (3/18) he was a little tachycardic, (low 100s). Overnight he had non-bloody diarrhea in the bed, which has continued all day. She spoke with her PCP over the phone who initially told her to give him more hydration. So she was giving extra water through the PEG tube. She also gave him imodium several times. She states he has had gastroenteritis in the past, which was successfully treatment with antibiotics and resolved. He then became tachycardic to 130s-140s so she called the PCP back, and told her to come to the ED. They have been self-quarantining at home for past 3 weeks (other than chemo appointments) and no one has come or gone through their home. She was very reluctant to take him to the ED today because of concern of COVID-19.     In ED, /88, . Temp 99.8. Sating 98% on RA. CXR with no acute cardiopulmonary disease. WBC 7.06. Lymphocytes 0.50. Plt 204. No transaminitis. ANC 5.81. Lactate 4.5, which improved to 2.6 s/p 2L IVF bolus. CT angio negative for acute PE. CT a/p revealed: The colon and rectum are moderately distended with gas and fluid. There is slight bowel wall thickening in the region of the distal ileum. Inflammatory process such as gastroenteritis, should be considered. s/p vanc and cefepime in the ED. To be admitted to medicine for further workup and management.   pt laying comfortable in bed, no cough no diarrhea      PMH/PSH:  PAST MEDICAL & SURGICAL HISTORY:  Esophageal cancer: on chemo. s/p PEG tube  Gout  Depression  History of BPH  CVA (cerebral vascular accident): with aphasia  Esophageal cancer: s/p PEG   Port placement on 2/10 for neoadjuvant therapy  Non Hodgkin's lymphoma: on observation  PEG (percutaneous endoscopic gastrostomy) status: secondary to esophageal ca  History of hip replacement  Cataract    ALLERGIES:  Allergies    No Known Drug Allergies  Originally Entered as [HIVES] reaction to [shrimp] (Unknown)  shellfish (Other)    Intolerances      SOCIAL HABITS:  negative x 3    FAMILY HISTORY:   FAMILY HISTORY:  FH: colon cancer: father      REVIEW OF SYSTEM:  Elements of review of systems are negative or non-applicable except as noted above in HPI section.       HOME MEDICATIONS:  atorvastatin 20 mg oral tablet  colchicine 0.6 mg oral tablet  desmopressin 0.2 mg oral tablet  escitalopram 20 mg oral tablet  Lovenox 60 mg/0.6 mL injectable solution  metoprolol tartrate 25 mg oral tablet  Plavix 75 mg oral tablet  tamsulosin 0.4 mg oral capsule    MEDICATIONS:  MEDICATIONS  (STANDING):  atorvastatin 20 milliGRAM(s) Oral at bedtime  cefepime   IVPB 2000 milliGRAM(s) IV Intermittent every 8 hours  clopidogrel Tablet 75 milliGRAM(s) Oral daily  colchicine 0.6 milliGRAM(s) Oral daily  desmopressin 0.2 milliGRAM(s) Oral daily  enoxaparin Injectable 60 milliGRAM(s) SubCutaneous every 12 hours  escitalopram 20 milliGRAM(s) Oral daily  famotidine   Suspension 40 milliGRAM(s) Oral two times a day  lactated ringers. 1000 milliLiter(s) (75 mL/Hr) IV Continuous <Continuous>  metoprolol tartrate 25 milliGRAM(s) Oral two times a day  metroNIDAZOLE  IVPB 500 milliGRAM(s) IV Intermittent every 8 hours  tamsulosin 0.4 milliGRAM(s) Oral at bedtime    MEDICATIONS  (PRN):        VITALS:   Vital Signs Last 24 Hrs  T(C): 36.2 (20 Mar 2020 13:27), Max: 37.4 (19 Mar 2020 22:03)  T(F): 97.2 (20 Mar 2020 13:27), Max: 99.3 (19 Mar 2020 22:03)  HR: 101 (20 Mar 2020 13:27) (100 - 105)  BP: 106/67 (20 Mar 2020 13:27) (106/67 - 134/82)  BP(mean): --  RR: 18 (20 Mar 2020 13:27) (18 - 18)  SpO2: 95% (20 Mar 2020 11:45) (95% - 99%)        PHYSICAL EXAM:    GENERAL: NAD  HEAD:  Atraumatic, Normocephalic  NECK: Supple, No JVD  CHEST/LUNG: b/l air entry  HEART: Regular rate and rhythm  ABDOMEN: Soft, Nontender, Nondistended peg tube  EXTREMITIES:  Good peripheral Pulses, No clubbing, cyanosis, or edema      LABS:                        8.9    4.51  )-----------( 123      ( 20 Mar 2020 07:13 )             26.2     03-20    135  |  104  |  29<H>  ----------------------------<  112<H>  3.7   |  19  |  0.8    Ca    7.8<L>      20 Mar 2020 07:13  Phos  2.4     03-20  Mg     1.7     03-20    TPro  4.0<L>  /  Alb  2.2<L>  /  TBili  0.5  /  DBili  x   /  AST  23  /  ALT  25  /  AlkPhos  50  03-20    LIVER FUNCTIONS - ( 20 Mar 2020 07:13 )  Alb: 2.2 g/dL / Pro: 4.0 g/dL / ALK PHOS: 50 U/L / ALT: 25 U/L / AST: 23 U/L / GGT: x               PT/INR - ( 19 Mar 2020 10:20 )   PT: 16.80 sec;   INR: 1.46 ratio         PTT - ( 19 Mar 2020 10:20 )  PTT:27.8 sec    Urinalysis Basic - ( 19 Mar 2020 18:25 )    Color: Yellow / Appearance: Clear / S.020 / pH: x  Gluc: x / Ketone: Negative  / Bili: Negative / Urobili: <2 mg/dL   Blood: x / Protein: Trace / Nitrite: Negative   Leuk Esterase: Negative / RBC: x / WBC x   Sq Epi: x / Non Sq Epi: x / Bacteria: x          DIAGNOSTIC STUDIES:    < from: CT Angio Chest w/ IV Cont (20 @ 17:10) >  TUBES/LINES: A right-sided Port-A-Cath is noted in place with its tip extending to the level of the superior vena cava.    PULMONARY ARTERY CIRCULATION: No evidence of central or segmental pulmonary embolus.    GREAT VESSELS/CARDIAC STRUCTURES/PERICARDIUM: The heart size is within normal limits. No pericardial effusion. Mild aortic calcifications are noted. There is no evidence of aortic aneurysm or dissection. The brachiocephalic vessels are unremarkable.    The ascending thoracic aorta measures 3.3 cm; the main pulmonary artery measures 2.2 cm. No evidence of right heart strain.     LUNG PARENCHYMA/CENTRAL AIRWAYS/PLEURA: The lungs are clear. No evidence of pleural effusion or pneumothorax    MEDIASTINUM/HILUM: There are no enlarged mediastinal, hilar or axillary lymph nodes. The esophagus is air and fluid filled.    CHEST WALL/AXILLA: Unremarkable.    UPPER ABDOMEN: Gastrostomy is noted in the mid upper abdomen. The retention device is within the stomach. The partially visualized upper abdomen shows no acute pathology.    OSSEOUS STRUCTURES: Degenerative changes of the spine are noted.        IMPRESSION:    No evidence of pulmonary embolism.    < end of copied text >

## 2020-03-20 NOTE — CONSULT NOTE ADULT - ASSESSMENT
75 y/o male with h/o CVA with aphasia, Bedbound, non-verbal at baseline, esophageal Ca s/p PEG tube, currently on chemo , NHL, PE (on lovenox) injections,    Doubt COVID 19   Pulmonary embolus hx  Esophageal ca on chemo    f/u covid 19 results  cont anithrombotics as per [place] [facility]  stable from the pulmonary standpoint for d/c planning  d/w wife and primary attending at bedside  if  discharge will need isolation until covid 19 results are back, if positive cont self quarantine for 14 days

## 2020-03-20 NOTE — DISCHARGE NOTE PROVIDER - NSDCCPCAREPLAN_GEN_ALL_CORE_FT
PRINCIPAL DISCHARGE DIAGNOSIS  Diagnosis: Diarrhea  Assessment and Plan of Treatment: your diarrhea is resolved. please stay hydrated and make sure you eat well. if symptoms recure please visit the ED or your primary care doctor      SECONDARY DISCHARGE DIAGNOSES  Diagnosis: URTI (acute upper respiratory infection)  Assessment and Plan of Treatment: Your diagnosis this visit was: Viral Respiratory Infection - Possible COVID-19  Please continue taking Acetaminophen  Please follow up with your medical doctor.   Follow the instructions provided regarding self-containment and self-monitoring.   Please return to the Emergency Department if worsening shortness of breath or any symptoms.   Call [phone] for any questions.   If you are not notified about your test results within 2 days, please call us back for the results.

## 2020-03-20 NOTE — DISCHARGE NOTE PROVIDER - HOSPITAL COURSE
75 y/o male    PMHx: Bedbound, non-verbal at baseline, esophageal Ca s/p PEG tube, currently on chemo (last treatment Wed. Home "chemo bag" finished on Fri. Wife is unsure what chemo drugs he is on), CVA with aphasia (2018), NHL (dx in 2018, currently on observation), HTN, DLD, recently diagnosed PE on lovenox injections, hx of cardiomyopathy of unknown origin that resolved, as per wife, with recent nuclear stress test that was negative with chief complain of non bloody diarrhea        History was obtained from patients wife (Renu, [phone]) at bedside who is his primary care giver. Patient is non-verbal and bedbound at baseline (uses wheelchair to get to and from clinic appointments). Wife says she checks his vital signs routinely at home and noticed yesterday (3/18) he was a little tachycardic, (low 100s). Overnight he had non-bloody diarrhea in the bed, which has continued all day. She spoke with her PCP over the phone who initially told her to give him more hydration. So she was giving extra water through the PEG tube. She also gave him imodium several times. She states he has had gastroenteritis in the past, which was successfully treatment with antibiotics and resolved. He then became tachycardic to 130s-140s so she called the PCP back, and told her to come to the ED. They have been self-quarantining at home for past 3 weeks (other than chemo appointments) and no one has come or gone through their home. She was very reluctant to take him to the ED today because of concern of COVID-19.         In ED, /88, . Temp 99.8. Sating 98% on RA. CXR with no acute cardiopulmonary disease. WBC 7.06. Lymphocytes 0.50. Plt 204. No transaminitis. ANC 5.81. Lactate 4.5, which improved to 2.6 s/p 2L IVF bolus. CT angio negative for acute PE. CT a/p revealed: The colon and rectum are moderately distended with gas and fluid. There is slight bowel wall thickening in the region of the distal ileum. Inflammatory process such as gastroenteritis, should be considered. s/p vanc and cefepime in the ED. To be admitted to medicine for further workup and management.        pt diarrhea resolved, lactate went down on fluids, no GI PCR or diff sent as pt did not had any bowel movement, likely diarrhea 2ry to chemotherapy. he also found to be tachycardiac liekly 2ry to diarrhea that improved after fluids. CT angio is negative for PE or any lung changes. pt normal lymphocyte no transaminitis, no thrombocytopenia, Flu A/B/RSV negative, pending COVID 19 testing although low suspicion.        pt is HD stable, no diarrhea, off oxygen and ready to be discharged home, will be given instructions to isolate himself till COVID testing results come back.    Vital Signs Last 24 Hrs    T(C): 36.8 (20 Mar 2020 11:45), Max: 37.4 (19 Mar 2020 22:03)    T(F): 98.2 (20 Mar 2020 11:45), Max: 99.3 (19 Mar 2020 22:03)    HR: 105 (20 Mar 2020 06:06) (100 - 105)    BP: 134/82 (20 Mar 2020 06:06) (123/63 - 134/82)    BP(mean): --    RR: 18 (20 Mar 2020 06:06) (18 - 18)    SpO2: 95% (20 Mar 2020 11:45) (95% - 99%) RA             GENERAL: Elderly M in NAD, no signs of respiratory distress, non-verbal    	HEAD: Atraumatic    	NECK: Supple    	CHEST/LUNG: Clear to auscultation bilaterally; No wheeze or crackles    	HEART: S1, S2; RRR; No murmurs, rubs, or gallops    	ABDOMEN: BS+; Soft, Non-tender, Non-distended. PEG tube in place - no surrounding erythema.     	EXTREMITIES:  2+ Peripheral Pulses, No clubbing, cyanosis, or edema    	PSYCH: AAOx0. Non-verbal    	NEUROLOGY: non-focal    SKIN: No rashes or lesions        Your diagnosis this visit was: Viral Respiratory Infection - Possible COVID-19    Please continue taking Acetaminophen / Ibuprofen as directed for fever and body aches / symptoms.     Please follow up with your medical doctor.     Follow the instructions provided regarding self-containment and self-monitoring.     Follow the recommendations by the CDC for returning to work and clearance.     Please return to the Emergency Department if worsening shortness of breath or any symptoms.     Call [phone] for any questions.     If you are not notified about your test results within 2 days, please call us back for the results.

## 2020-03-20 NOTE — DISCHARGE NOTE NURSING/CASE MANAGEMENT/SOCIAL WORK - PATIENT PORTAL LINK FT
You can access the FollowMyHealth Patient Portal offered by [facility] by registering at the following website: [email or web address]. By joining [email or web address] FollowMyHealth portal, you will also be able to view your health information using other applications (apps) compatible with our system.

## 2020-03-21 LAB
CRP SERPL-MCNC: 6.47 MG/DL — HIGH (ref 0–0.4)
FERRITIN SERPL-MCNC: 905 NG/ML — HIGH (ref 30–400)
PROCALCITONIN SERPL-MCNC: 1.16 NG/ML — HIGH (ref 0.02–0.1)

## 2020-03-23 LAB — RAPID RVP RESULT: SIGNIFICANT CHANGE UP

## 2020-03-24 LAB
CULTURE RESULTS: SIGNIFICANT CHANGE UP
CULTURE RESULTS: SIGNIFICANT CHANGE UP
SARS-COV-2 RNA SPEC QL NAA+PROBE: SIGNIFICANT CHANGE UP
SPECIMEN SOURCE: SIGNIFICANT CHANGE UP
SPECIMEN SOURCE: SIGNIFICANT CHANGE UP

## 2021-05-17 ENCOUNTER — INPATIENT (INPATIENT)
Facility: HOSPITAL | Age: 76
LOS: 3 days | Discharge: HOSPICE | End: 2021-05-21
Attending: INTERNAL MEDICINE | Admitting: INTERNAL MEDICINE
Payer: MEDICARE

## 2021-05-17 VITALS
RESPIRATION RATE: 18 BRPM | WEIGHT: 117.95 LBS | HEIGHT: 64 IN | TEMPERATURE: 98 F | DIASTOLIC BLOOD PRESSURE: 88 MMHG | OXYGEN SATURATION: 98 % | HEART RATE: 71 BPM | SYSTOLIC BLOOD PRESSURE: 165 MMHG

## 2021-05-17 DIAGNOSIS — Z93.1 GASTROSTOMY STATUS: Chronic | ICD-10-CM

## 2021-05-17 DIAGNOSIS — H26.9 UNSPECIFIED CATARACT: Chronic | ICD-10-CM

## 2021-05-17 DIAGNOSIS — Z96.649 PRESENCE OF UNSPECIFIED ARTIFICIAL HIP JOINT: Chronic | ICD-10-CM

## 2021-05-17 PROBLEM — C15.9 MALIGNANT NEOPLASM OF ESOPHAGUS, UNSPECIFIED: Chronic | Status: ACTIVE | Noted: 2020-03-19

## 2021-05-17 LAB
ALBUMIN SERPL ELPH-MCNC: 3.2 G/DL — LOW (ref 3.5–5.2)
ALP SERPL-CCNC: 115 U/L — SIGNIFICANT CHANGE UP (ref 30–115)
ALT FLD-CCNC: 12 U/L — SIGNIFICANT CHANGE UP (ref 0–41)
ANION GAP SERPL CALC-SCNC: 15 MMOL/L — HIGH (ref 7–14)
AST SERPL-CCNC: 17 U/L — SIGNIFICANT CHANGE UP (ref 0–41)
BASOPHILS # BLD AUTO: 0.04 K/UL — SIGNIFICANT CHANGE UP (ref 0–0.2)
BASOPHILS NFR BLD AUTO: 0.4 % — SIGNIFICANT CHANGE UP (ref 0–1)
BILIRUB SERPL-MCNC: 0.7 MG/DL — SIGNIFICANT CHANGE UP (ref 0.2–1.2)
BLD GP AB SCN SERPL QL: SIGNIFICANT CHANGE UP
BUN SERPL-MCNC: 18 MG/DL — SIGNIFICANT CHANGE UP (ref 10–20)
CALCIUM SERPL-MCNC: 8.7 MG/DL — SIGNIFICANT CHANGE UP (ref 8.5–10.1)
CHLORIDE SERPL-SCNC: 104 MMOL/L — SIGNIFICANT CHANGE UP (ref 98–110)
CO2 SERPL-SCNC: 22 MMOL/L — SIGNIFICANT CHANGE UP (ref 17–32)
CREAT SERPL-MCNC: 1.1 MG/DL — SIGNIFICANT CHANGE UP (ref 0.7–1.5)
EOSINOPHIL # BLD AUTO: 0.06 K/UL — SIGNIFICANT CHANGE UP (ref 0–0.7)
EOSINOPHIL NFR BLD AUTO: 0.7 % — SIGNIFICANT CHANGE UP (ref 0–8)
GLUCOSE SERPL-MCNC: 92 MG/DL — SIGNIFICANT CHANGE UP (ref 70–99)
HCT VFR BLD CALC: 26.8 % — LOW (ref 42–52)
HGB BLD-MCNC: 8.7 G/DL — LOW (ref 14–18)
IMM GRANULOCYTES NFR BLD AUTO: 0.7 % — HIGH (ref 0.1–0.3)
LYMPHOCYTES # BLD AUTO: 0.51 K/UL — LOW (ref 1.2–3.4)
LYMPHOCYTES # BLD AUTO: 5.7 % — LOW (ref 20.5–51.1)
MCHC RBC-ENTMCNC: 27.8 PG — SIGNIFICANT CHANGE UP (ref 27–31)
MCHC RBC-ENTMCNC: 32.5 G/DL — SIGNIFICANT CHANGE UP (ref 32–37)
MCV RBC AUTO: 85.6 FL — SIGNIFICANT CHANGE UP (ref 80–94)
MONOCYTES # BLD AUTO: 0.71 K/UL — HIGH (ref 0.1–0.6)
MONOCYTES NFR BLD AUTO: 8 % — SIGNIFICANT CHANGE UP (ref 1.7–9.3)
NEUTROPHILS # BLD AUTO: 7.53 K/UL — HIGH (ref 1.4–6.5)
NEUTROPHILS NFR BLD AUTO: 84.5 % — HIGH (ref 42.2–75.2)
NRBC # BLD: 0 /100 WBCS — SIGNIFICANT CHANGE UP (ref 0–0)
PLATELET # BLD AUTO: 177 K/UL — SIGNIFICANT CHANGE UP (ref 130–400)
POTASSIUM SERPL-MCNC: 3.9 MMOL/L — SIGNIFICANT CHANGE UP (ref 3.5–5)
POTASSIUM SERPL-SCNC: 3.9 MMOL/L — SIGNIFICANT CHANGE UP (ref 3.5–5)
PROT SERPL-MCNC: 5.6 G/DL — LOW (ref 6–8)
RBC # BLD: 3.13 M/UL — LOW (ref 4.7–6.1)
RBC # FLD: 19.2 % — HIGH (ref 11.5–14.5)
SARS-COV-2 RNA SPEC QL NAA+PROBE: SIGNIFICANT CHANGE UP
SODIUM SERPL-SCNC: 141 MMOL/L — SIGNIFICANT CHANGE UP (ref 135–146)
TROPONIN T SERPL-MCNC: 0.02 NG/ML — HIGH
WBC # BLD: 8.91 K/UL — SIGNIFICANT CHANGE UP (ref 4.8–10.8)
WBC # FLD AUTO: 8.91 K/UL — SIGNIFICANT CHANGE UP (ref 4.8–10.8)

## 2021-05-17 PROCEDURE — 93010 ELECTROCARDIOGRAM REPORT: CPT

## 2021-05-17 PROCEDURE — 99285 EMERGENCY DEPT VISIT HI MDM: CPT | Mod: CS

## 2021-05-17 PROCEDURE — 71045 X-RAY EXAM CHEST 1 VIEW: CPT | Mod: 26

## 2021-05-17 PROCEDURE — 73502 X-RAY EXAM HIP UNI 2-3 VIEWS: CPT | Mod: 26,RT

## 2021-05-17 PROCEDURE — 73560 X-RAY EXAM OF KNEE 1 OR 2: CPT | Mod: 26,RT

## 2021-05-17 PROCEDURE — G1004: CPT

## 2021-05-17 PROCEDURE — 99223 1ST HOSP IP/OBS HIGH 75: CPT

## 2021-05-17 PROCEDURE — 72125 CT NECK SPINE W/O DYE: CPT | Mod: 26,ME

## 2021-05-17 PROCEDURE — 99283 EMERGENCY DEPT VISIT LOW MDM: CPT

## 2021-05-17 PROCEDURE — 73700 CT LOWER EXTREMITY W/O DYE: CPT | Mod: 26,RT,ME

## 2021-05-17 PROCEDURE — 73552 X-RAY EXAM OF FEMUR 2/>: CPT | Mod: 26,RT

## 2021-05-17 PROCEDURE — 70450 CT HEAD/BRAIN W/O DYE: CPT | Mod: 26,ME

## 2021-05-17 RX ORDER — COLCHICINE 0.6 MG
0.6 TABLET ORAL DAILY
Refills: 0 | Status: DISCONTINUED | OUTPATIENT
Start: 2021-05-17 | End: 2021-05-21

## 2021-05-17 RX ORDER — CLOPIDOGREL BISULFATE 75 MG/1
1 TABLET, FILM COATED ORAL
Qty: 0 | Refills: 0 | DISCHARGE

## 2021-05-17 RX ORDER — SENNA PLUS 8.6 MG/1
2 TABLET ORAL AT BEDTIME
Refills: 0 | Status: DISCONTINUED | OUTPATIENT
Start: 2021-05-17 | End: 2021-05-21

## 2021-05-17 RX ORDER — TAMSULOSIN HYDROCHLORIDE 0.4 MG/1
1 CAPSULE ORAL
Qty: 0 | Refills: 0 | DISCHARGE

## 2021-05-17 RX ORDER — MORPHINE SULFATE 50 MG/1
2 CAPSULE, EXTENDED RELEASE ORAL ONCE
Refills: 0 | Status: DISCONTINUED | OUTPATIENT
Start: 2021-05-17 | End: 2021-05-17

## 2021-05-17 RX ORDER — DESMOPRESSIN ACETATE 0.1 MG/1
0.2 TABLET ORAL DAILY
Refills: 0 | Status: DISCONTINUED | OUTPATIENT
Start: 2021-05-17 | End: 2021-05-21

## 2021-05-17 RX ORDER — TAMSULOSIN HYDROCHLORIDE 0.4 MG/1
0.4 CAPSULE ORAL AT BEDTIME
Refills: 0 | Status: DISCONTINUED | OUTPATIENT
Start: 2021-05-17 | End: 2021-05-21

## 2021-05-17 RX ORDER — METOPROLOL TARTRATE 50 MG
1 TABLET ORAL
Qty: 0 | Refills: 0 | DISCHARGE

## 2021-05-17 RX ORDER — ATORVASTATIN CALCIUM 80 MG/1
1 TABLET, FILM COATED ORAL
Qty: 0 | Refills: 0 | DISCHARGE

## 2021-05-17 RX ORDER — FLUDROCORTISONE ACETATE 0.1 MG/1
0.1 TABLET ORAL
Refills: 0 | Status: DISCONTINUED | OUTPATIENT
Start: 2021-05-17 | End: 2021-05-21

## 2021-05-17 RX ORDER — COLCHICINE 0.6 MG
1 TABLET ORAL
Qty: 0 | Refills: 0 | DISCHARGE

## 2021-05-17 RX ORDER — ENOXAPARIN SODIUM 100 MG/ML
40 INJECTION SUBCUTANEOUS AT BEDTIME
Refills: 0 | Status: DISCONTINUED | OUTPATIENT
Start: 2021-05-17 | End: 2021-05-21

## 2021-05-17 RX ORDER — TRAMADOL HYDROCHLORIDE 50 MG/1
50 TABLET ORAL EVERY 4 HOURS
Refills: 0 | Status: DISCONTINUED | OUTPATIENT
Start: 2021-05-17 | End: 2021-05-19

## 2021-05-17 RX ORDER — METOPROLOL TARTRATE 50 MG
25 TABLET ORAL
Refills: 0 | Status: DISCONTINUED | OUTPATIENT
Start: 2021-05-17 | End: 2021-05-21

## 2021-05-17 RX ORDER — MEGESTROL ACETATE 40 MG/ML
20 SUSPENSION ORAL DAILY
Refills: 0 | Status: DISCONTINUED | OUTPATIENT
Start: 2021-05-17 | End: 2021-05-21

## 2021-05-17 RX ORDER — DESMOPRESSIN ACETATE 0.1 MG/1
0 TABLET ORAL
Qty: 0 | Refills: 0 | DISCHARGE

## 2021-05-17 RX ORDER — CLOPIDOGREL BISULFATE 75 MG/1
75 TABLET, FILM COATED ORAL DAILY
Refills: 0 | Status: DISCONTINUED | OUTPATIENT
Start: 2021-05-17 | End: 2021-05-21

## 2021-05-17 RX ORDER — MORPHINE SULFATE 50 MG/1
2 CAPSULE, EXTENDED RELEASE ORAL EVERY 4 HOURS
Refills: 0 | Status: DISCONTINUED | OUTPATIENT
Start: 2021-05-17 | End: 2021-05-21

## 2021-05-17 RX ORDER — MEGESTROL ACETATE 40 MG/ML
1 SUSPENSION ORAL
Qty: 0 | Refills: 0 | DISCHARGE

## 2021-05-17 RX ORDER — ATORVASTATIN CALCIUM 80 MG/1
20 TABLET, FILM COATED ORAL AT BEDTIME
Refills: 0 | Status: DISCONTINUED | OUTPATIENT
Start: 2021-05-17 | End: 2021-05-21

## 2021-05-17 RX ORDER — ENOXAPARIN SODIUM 100 MG/ML
60 INJECTION SUBCUTANEOUS
Qty: 0 | Refills: 0 | DISCHARGE

## 2021-05-17 RX ORDER — OXYCODONE AND ACETAMINOPHEN 5; 325 MG/1; MG/1
1 TABLET ORAL EVERY 4 HOURS
Refills: 0 | Status: DISCONTINUED | OUTPATIENT
Start: 2021-05-17 | End: 2021-05-21

## 2021-05-17 RX ORDER — ESCITALOPRAM OXALATE 10 MG/1
20 TABLET, FILM COATED ORAL DAILY
Refills: 0 | Status: DISCONTINUED | OUTPATIENT
Start: 2021-05-17 | End: 2021-05-21

## 2021-05-17 RX ORDER — FLUDROCORTISONE ACETATE 0.1 MG/1
1 TABLET ORAL
Qty: 0 | Refills: 0 | DISCHARGE

## 2021-05-17 RX ORDER — ESCITALOPRAM OXALATE 10 MG/1
1 TABLET, FILM COATED ORAL
Qty: 0 | Refills: 0 | DISCHARGE

## 2021-05-17 RX ADMIN — Medication 25 MILLIGRAM(S): at 22:05

## 2021-05-17 RX ADMIN — MORPHINE SULFATE 2 MILLIGRAM(S): 50 CAPSULE, EXTENDED RELEASE ORAL at 16:40

## 2021-05-17 RX ADMIN — ATORVASTATIN CALCIUM 20 MILLIGRAM(S): 80 TABLET, FILM COATED ORAL at 22:43

## 2021-05-17 RX ADMIN — CLOPIDOGREL BISULFATE 75 MILLIGRAM(S): 75 TABLET, FILM COATED ORAL at 22:05

## 2021-05-17 RX ADMIN — MORPHINE SULFATE 2 MILLIGRAM(S): 50 CAPSULE, EXTENDED RELEASE ORAL at 21:44

## 2021-05-17 RX ADMIN — MORPHINE SULFATE 2 MILLIGRAM(S): 50 CAPSULE, EXTENDED RELEASE ORAL at 22:24

## 2021-05-17 RX ADMIN — MORPHINE SULFATE 2 MILLIGRAM(S): 50 CAPSULE, EXTENDED RELEASE ORAL at 10:31

## 2021-05-17 RX ADMIN — TAMSULOSIN HYDROCHLORIDE 0.4 MILLIGRAM(S): 0.4 CAPSULE ORAL at 22:05

## 2021-05-17 NOTE — ED PROVIDER NOTE - OBJECTIVE STATEMENT
77 y/o male with hx CVA R paralysis, non-verbal 2013, Esopagheal CA on chemotherapy, HTN, High Cholesterol BIBA accompanied by wife c/o "He was going to the bathroom last night and lost his balance falling to the left. I attempted to catch him and fell on left side. I'm not sure if he hit his head. Hes having pain to his right hip."

## 2021-05-17 NOTE — ED PROVIDER NOTE - PROGRESS NOTE DETAILS
Dr. Hackett: Sign out to  [facility].   Patient pending CT scan Dr Marc called ? proximal femur fx. Spoke to Ortho RADHA Henenssy. Awaiting Ct results. Trauma consult called. PODLOG: Case discussed with trauma who evaluated pt. Pending ortho eval. Evaluated by Ortho RADHA Hennessy- no plan to do surgery because patient not mobile. Spoke with Dr De Paz and recommends admission to medicine. Signed out to MAR

## 2021-05-17 NOTE — H&P ADULT - HISTORY OF PRESENT ILLNESS
77 yo M non verbal with pmh of stroke with right sided weakness, esophageal cancer currently being treated with Chemotherapy presenting to the ED today with his wife bedside, after a fall at home last night while the patient was walking to the bathroom with right hip pain. According to patient's wife he has not been ambulating much recently due to overall weakness. A baseline patient ambulates with an assistive device. No further complaints.     Vital Signs Last 24 Hrs  T(C): 36.6 (17 May 2021 20:44), Max: 36.8 (17 May 2021 09:05)  T(F): 97.9 (17 May 2021 20:44), Max: 98.3 (17 May 2021 09:05)  HR: 83 (17 May 2021 20:44) (71 - 83)  BP: 191/104 (17 May 2021 20:44) (165/88 - 191/104)  BP(mean): --  RR: 18 (17 May 2021 20:44) (18 - 18)  SpO2: 96% (17 May 2021 20:44) (96% - 98%)    In ED, trauma team consulted. Pt found to have R non-displaced subtrochanteric fx. Seen by ortho and no plan for sx at this time.

## 2021-05-17 NOTE — CONSULT NOTE ADULT - ASSESSMENT
ASSESSMENT:  76y old m s/p mechanical fall last night with nondisplaced subtrochanteric periprosthetic fracture     PLAN:    - Ortho evaluation for possible intervention   - NO further imaging from trauma perspective   -  d/w Dr. Mnan

## 2021-05-17 NOTE — ED ADULT TRIAGE NOTE - CHIEF COMPLAINT QUOTE
trip and fall last night. fell onto right hip.  did not hit head. no loc. only on plavix. having tenderness to right hip and leg.

## 2021-05-17 NOTE — ED PROVIDER NOTE - BIRTH SEX
Patient : Raj Lester Age: 35 year old Sex: male   MRN: [id number] Encounter Date: 8/31/2017      History     Chief Complaint   Patient presents with   • Dental Pain     HPI   Y01/Y01  8/31/2017  1512 Raj Lester is a 35 year old male who presents to the ED c/o left low dental pain and swelling that began earlier this week. Patient denies any fever, drainage, difficulty breathing. Patient denies any other symptoms. No other complaints or modifying factors were reported.    PCP: Alexandria No MD      Allergies   Allergen Reactions   • Bee Sting SWELLING       Prior to Admission Medications    BACLOFEN (LIORESAL) 10 MG TABLET    Take 1 tablet by mouth 2 times daily as needed (muscle spasm).    HYDROCODONE-ACETAMINOPHEN (NORCO) 5-325 MG PER TABLET    Take 1 tablet by mouth every 6 hours as needed for Pain.    IBUPROFEN (MOTRIN) 600 MG TABLET    Take 1 tablet by mouth every 6 hours as needed for Pain.    PENICILLIN V POTASSIUM (VEETID) 500 MG TABLET    Take 1 tablet by mouth 3 times daily.    TRAMADOL (ULTRAM) 50 MG TABLET    Take 1 tablet PO bid as needed for pain       Past Medical History:   Diagnosis Date   • GERD (gastroesophageal reflux disease)    • Right wrist fracture    • Rotator cuff syndrome of right shoulder    • Tobacco dependence        Past Surgical History:   Procedure Laterality Date   • SHOULDER ARTHROSCOPY W/ SUPERIOR LABRAL ANTERIOR POSTERIOR REPAIR  10/09/2012    right     History reviewed. No pertinent family history.    Social History   Substance Use Topics   • Smoking status: Current Every Day Smoker     Packs/day: 1.00     Years: 15.00     Types: Cigarettes   • Smokeless tobacco: Never Used   • Alcohol use No       Review of Systems   Constitutional: Negative for activity change, chills and fever.   HENT: Positive for dental problem (left low pain and swelling). Negative for congestion, facial swelling and rhinorrhea.    Eyes: Negative for discharge and redness.   Respiratory: Negative for  shortness of breath and wheezing.    Gastrointestinal: Negative for nausea and vomiting.   Genitourinary: Negative for decreased urine volume and dysuria.   Musculoskeletal: Negative.  Negative for gait problem and joint swelling.   Skin: Negative.  Negative for color change and rash.   Neurological: Negative.  Negative for tremors and speech difficulty.   Psychiatric/Behavioral: Negative.  Negative for agitation and confusion.   All other systems reviewed and are negative.      Physical Exam     ED Triage Vitals [08/31/17 1502]   ED Triage Vitals Group      Temp 98.2 °F (36.8 °C)      Pulse 78      Resp 16      /83      SpO2 100 %      EtCO2 mmHg       Height 5' 11\" (1.803 m)      Weight 175 lb (79.4 kg)      Weight Scale Used        Physical Exam   Constitutional: He is oriented to person, place, and time. He appears well-developed and well-nourished. He is cooperative.  Non-toxic appearance. No distress.   HENT:   Head: Normocephalic and atraumatic.   Right Ear: External ear normal.   Left Ear: External ear normal.   Nose: Nose normal.   Mouth/Throat: Mucous membranes are normal.       mild facial tenderness of left mandible.   Poor overall dentition in various stages of decay   Eyes: Conjunctivae, EOM and lids are normal.   Neck: Normal range of motion. No no neck rigidity.   Cardiovascular:   No murmur heard.  Pulmonary/Chest: Effort normal. No respiratory distress.   Abdominal: Normal appearance. He exhibits no distension.   Musculoskeletal: Normal range of motion. He exhibits no edema.   Neurological: He is alert and oriented to person, place, and time. No cranial nerve deficit. Coordination normal.   Skin: Skin is warm and dry. No rash noted. No pallor.   Psychiatric: He has a normal mood and affect. His speech is normal and behavior is normal. Judgment and thought content normal. Cognition and memory are normal.   Nursing note and vitals reviewed.      ED Course     Incision and Drainage  Date/Time:  8/31/2017 3:15 PM  Performed by: TAMARA PEREIRA  Authorized by: CHRIS JASSO     Consent:     Consent obtained:  Verbal    Consent given by:  Patient    Risks discussed:  Bleeding, incomplete drainage and pain    Alternatives discussed:  No treatment and delayed treatment  Universal protocol:     Procedure explained and questions answered to patient or proxy's satisfaction: yes      Site/side marked: yes      Immediately prior to procedure a time out was called: yes    Location:     Type:  Abscess    Location:  Mouth    Mouth location:  Alveolar process  Anesthesia (see MAR for exact dosages):     Anesthesia method:  None  Procedure details:     Needle aspiration: no      Incision types:  Stab incision    Scalpel blade:  11    Drainage:  Bloody    Drainage amount:  Scant    Wound treatment:  Wound left open    Packing materials:  None  Post-procedure details:     Patient tolerance of procedure:  Tolerated well, no immediate complications        ED Medication Orders     None          [facility]  Vitals  Vitals:    08/31/17 1502   BP: 135/83   Pulse: 78   Resp: 16   Temp: 98.2 °F (36.8 °C)   TempSrc: Oral   SpO2: 100%   Weight: 79.4 kg   Height: 5' 11\" (1.803 m)       ED Course    1518 Rechecked patient. Discussed with patient case thus far including plan to treat with antibiotics. Directed paient to follow up with his dentist. Patient expresses understanding and agreement with plan of care. All questions addressed.       [facility]      Critical Care time spent on this patient outside of billable procedures:  None    Discharge  Clinical Impression  ED Diagnoses        Final diagnoses    Pain, dental     Abscess, dental           The patient was provided with a recommendation to follow up with a primary care provider and obtain reassessment of his blood pressure within three months.    Follow Up:  [facility]  [address] [facility] [place] [id number] [phone]  Schedule an appointment  as soon as possible for a visit  or follow up with your regular dentist       Discharge Medication List as of 8/31/2017  3:18 PM      START taking these medications    Details   !! penicillin v potassium (VEETID) 500 MG tablet Take 1 tablet by mouth 4 times daily.Normal, Disp-40 tablet, R-0      !! ibuprofen (MOTRIN) 600 MG tablet Take 1 tablet by mouth every 6 hours as needed for Pain.Normal, Disp-15 tablet, R-0       !! - Potential duplicate medications found. Please discuss with provider.          Patient is discharged to home/self care in stable condition.     ________________________________________________________________    I have reviewed the information recorded by the scribe for accuracy and agree with its contents.    Jeanine Luciano acting as a scribe for Jeff Cardenas PA-C    Physician Assistant: Jeff Cardenas PA-C  SER: [id number]    Supervising Physician: Sienna Stanford MD  IDX No: [id number]      Scribe: Jeanine Cardenas PA-C  08/31/17 2164     Male

## 2021-05-17 NOTE — CONSULT NOTE ADULT - SUBJECTIVE AND OBJECTIVE BOX
Orthopaedic Surgery Consult Note    HPI:  76yMale non verbal with pmh of stroke with right sided weakness, esophageal cancer currently being treated with Chemotherapy, presenting to the ED today with his wife bedside, after a fall at home last night while the patient was walking to the bathroom with right hip pain. According to patients wife he has not been ambulating much recently due to overall weakness. A baseline patient ambulates with an assistive device.  Allergies    No Known Drug Allergies  Originally Entered as [HIVES] reaction to [shrimp] (Unknown)  shellfish (Other)    Intolerances      PAST MEDICAL & SURGICAL HISTORY:  Non Hodgkin&#x27;s lymphoma  on observation    Esophageal cancer  s/p PEG   Port placement on 2/10 for neoadjuvant therapy  CVA (cerebral vascular accident)  with aphasia  History of BPH  Depression  Gout  Esophageal cancer  on chemo. s/p PEG tube  Cataract  History of hip replacement  PEG (percutaneous endoscopic gastrostomy) status  secondary to esophageal ca      Vital Signs Last 24 Hrs  T(C): 36.8 (17 May 2021 09:05), Max: 36.8 (17 May 2021 09:05)  T(F): 98.3 (17 May 2021 09:05), Max: 98.3 (17 May 2021 09:05)  HR: 71 (17 May 2021 09:05) (71 - 71)  BP: 165/88 (17 May 2021 09:05) (165/88 - 165/88)  BP(mean): --  RR: 18 (17 May 2021 09:05) (18 - 18)  SpO2: 98% (17 May 2021 09:05) (98% - 98%)    Physical Exam:  Patient is non verbal, laying in ER stretcher in NAD, unlabored breathing on RA     RLE:  skin intact, healed scar from previous hip replacement, no erythema or swelling   +ttp overlying lateral proximal femur   FROM of ankle/knee/hip  compartments soft and compressible  sensation intact  motor intact   palpable pulses    Remainder of MSK exam unremarkable, patient not complaining of any pain elsewhere during survey of body                           8.7    8.91  )-----------( 177      ( 17 May 2021 09:38 )             26.8     05-17    141  |  104  |  18  ----------------------------<  92  3.9   |  22  |  1.1    Ca    8.7      17 May 2021 09:38  Mg     1.8     05-17    TPro  5.6<L>  /  Alb  3.2<L>  /  TBili  0.7  /  DBili  x   /  AST  17  /  ALT  12  /  AlkPhos  115  05-17      Imaging: non displaced right periprosthetic hip fracture     A/P: 76yMale with non displaced right periprosthetic hip fracture   -admit to medicine for inability to ambulate  -NWB RLE   -pain control per primary  -PT/OT  -dvt ppx per primary   -Will discuss with ; likely non-operative treatment

## 2021-05-17 NOTE — ED PROVIDER NOTE - CLINICAL SUMMARY MEDICAL DECISION MAKING FREE TEXT BOX
Pt presented to ED for hip pain s/p fall yesterday. Labs, imaging obtained. PT endorsed to me. Pt with periprosthetic fx on CT. Ortho and trauma eval obtained. Pt not surgical candidate per ortho, discussed with trauma, can admit to medicine. Endorsed to MAR.

## 2021-05-17 NOTE — ED ADULT NURSE REASSESSMENT NOTE - NS ED NURSE REASSESS COMMENT FT1
as per AND Mirella patient wife allowed to stay with patient on floor due to history of stroke causing communication issues.

## 2021-05-17 NOTE — CONSULT NOTE ADULT - SUBJECTIVE AND OBJECTIVE BOX
TRAUMA ACTIVATION LEVEL:  Consult    MECHANISM OF INJURY:      [] Blunt  	[] MVC	[x] Fall	[] Pedestrian Struck	[] Motorcycle   [] Assault   [] Bicycle collision  [] Sports injury     [] Penetrating  	[] Gun Shot Wound 		[] Stab Wound    GCS: 	E: 4	V: 5	M: 6    HPI:  Patient is 76M with PMH listed below who presented to the ED s/p fall last night onto right side while going to the bathroom. He is non verbal but indicated he has pain in his right hip when he woke up. xrays were negative but CT scan showed a nondisplaced fracture in a previously repaired right hip     PAST MEDICAL & SURGICAL HISTORY:  Non Hodgkin&#x27;s lymphoma on observation  Esophageal cancer s/p PEG   Port placement on 2/10 for neoadjuvant therapy  CVA (cerebral vascular accident) with aphasia  History of BPH  Depression  Gout  Esophageal cancer on chemo. s/p PEG tube  Cataract  History of hip replacement  PEG (percutaneous endoscopic gastrostomy) status secondary to esophageal ca    Allergies  No Known Drug Allergies  Originally Entered as [HIVES] reaction to [shrimp] (Unknown) shellfish (Other)      Home Medications:  atorvastatin 20 mg oral tablet: 1 tab(s) orally once a day (19 Mar 2020 21:14)  colchicine 0.6 mg oral tablet: 1 tab(s) orally once a day (19 Mar 2020 21:14)  desmopressin 0.2 mg oral tablet: orally once a day (19 Mar 2020 21:15)  escitalopram 20 mg oral tablet: 1 tab(s) orally once a day (19 Mar 2020 21:14)  Lovenox 60 mg/0.6 mL injectable solution: 60 unit(s) injectable 2 times a day (19 Mar 2020 21:15)  metoprolol tartrate 25 mg oral tablet: 1 tab(s) orally 2 times a day (19 Mar 2020 21:15)  Plavix 75 mg oral tablet: 1 tab(s) orally once a day (19 Mar 2020 21:15)  tamsulosin 0.4 mg oral capsule: 1 cap(s) orally once a day (19 Mar 2020 21:14)      ROS: 10-system review is otherwise negative except HPI above.      Primary Survey:    A - airway intact  B - bilateral breath sounds and good chest rise  C - palpable pulses in all extremities  D - GCS 15 on arrival, COLVIN  Exposure obtained    Vital Signs Last 24 Hrs  T(C): 36.8 (17 May 2021 09:05), Max: 36.8 (17 May 2021 09:05)  T(F): 98.3 (17 May 2021 09:05), Max: 98.3 (17 May 2021 09:05)  HR: 71 (17 May 2021 09:05) (71 - 71)  BP: 165/88 (17 May 2021 09:05) (165/88 - 165/88)  RR: 18 (17 May 2021 09:05) (18 - 18)  SpO2: 98% (17 May 2021 09:05) (98% - 98%)    Secondary Survey:   General: NAD  HEENT: Normocephalic, atraumatic, EOMI, PEERLA. no scalp lacerations   Neck: Soft, midline trachea. no cspine tenderness  Chest: No chest wall tenderness. or subq  emphysema   Cardiac: S1, S2, RRR  Respiratory: Bilateral breath sounds, clear and equal bilaterally  Abdomen: Soft, non-distended, non-tender, no rebound,   Groin: Normal appearing, pelvis stable   Ext: palp radial b/l UE, b/l DP palp in Lower Extrem. pain in right hip    Back: no TTP, no palpable runoff/stepoff/deformity    LABS:                      8.7    8.91  )-----------( 177      ( 17 May 2021 09:38 )             26.8       Auto Neutrophil %: 84.5 % (05-17-21 @ 09:38)  Auto Immature Granulocyte %: 0.7 % (05-17-21 @ 09:38)    05-17    141  |  104  |  18  ----------------------------<  92  3.9   |  22  |  1.1      Calcium, Total Serum: 8.7 mg/dL (05-17-21 @ 09:38)      LFTs:             5.6  | 0.7  | 17       ------------------[115     ( 17 May 2021 09:38 )  3.2  | x    | 12          Coags:    CARDIAC MARKERS ( 17 May 2021 09:38 )  x     / 0.02 ng/mL / x     / x     / x          RADIOLOGY & ADDITIONAL STUDIES:  < from: CT Head No Cont (05.17.21 @ 10:09) >  IMPRESSION:  CT HEAD:  No acute intracranial pathology or hemorrhage. No acute calvarial fracture.  Chronic left MCA territory infarct.    CT CERVICAL SPINE:  No acute fracture or subluxation.  Multilevel degenerative changes as detailed above.  4 mm right apical lung nodule which is new since the prior chest CTA of 3/19/2020. Follow-up dedicated nonemergent chest CT is recommended.  < end of copied text >    < from: Xray Hip 2-3 Views, Right (05.17.21 @ 11:04) >  Impression:  Subtle lucency along the proximal lateral shaft of the femur, suspicious for a fracture.  < end of copied text >    < from: Xray Knee 1 or 2 Views, Right (05.17.21 @ 11:05) >  IMPRESSION:  No acute displaced fracture.  < end of copied text >    < from: CT Lower Extremity No Cont, Right (05.17.21 @ 16:34) >  IMPRESSION:  Acute oblique, nondisplaced periprosthetic fracture of the subtrochanteric right proximal femur.  < end of copied text >  ---------------------------------------------------------------------------------------

## 2021-05-17 NOTE — ED PROVIDER NOTE - PMH
CVA (cerebral vascular accident)  with aphasia  Depression    Esophageal cancer  on chemo. s/p PEG tube  Esophageal cancer  s/p PEG   Port placement on 2/10 for neoadjuvant therapy  Gout    History of BPH    Non Hodgkin's lymphoma  on observation

## 2021-05-17 NOTE — ED PROVIDER NOTE - ATTENDING CONTRIBUTION TO CARE
77 yo M with PMH of CVA with R sided paralysis now non-verbal, esophageal CA on chemo, HTN presents to ED for R hip pain sp fall. Pt has not been ambulatory since. Wife states she does not think he hit his head. No fever, chills, SOB, CP, nausea, vomiting, abdominal pain.     Const: contracted  Eyes: PERRL, no conjunctival injection  HENT:  Neck supple without meningismus   CV: RRR, Warm, well-perfused extremities  RESP: CTA B/L, no tachypnea   GI: soft, non-tender, non-distended  MSK: R hip pain. Tender  to palpation.   Skin: Warm, dry. No rashes    Will do labs, CT, xray

## 2021-05-17 NOTE — H&P ADULT - ASSESSMENT
75 yo M non verbal with pmh of stroke with right sided weakness, esophageal cancer currently being treated with Chemotherapy presenting to the ED s/p mechanical fall.     # Acute oblique, nondisplaced fracture of the subtrochanteric right proximal femur  # s/p Mechanical fall  - NWB RLE   - pain control w/ tramadol around the clock (pt is non-verbal and unable to express pain); morphine 2 mg PRN for severe pain  - order PT/OT  - Lovenox 40 qHS for DVT ppx  - per ortho, likely no surgery     # Hx of esophageal CA  # s/p PEG tube removal  - per wife, pt can have regular diet  - f/u [facility]    # Stroke with right sided weakness  - c/w plavix     # HTN  # HLD  - c/w home meds  - DASH diet    # Chronic hyponatremia?  - Unclear why pt is on desmopressin and fludrocortisone as per wife, he had been taking it for years.   - c/w home meds    Diet: DASH diet  GI ppx: Not indicated  DVT ppx: Lovenox 40 mg qHS  Activity: Increase as tolerated  Code status: Full Code ( X ) / DNR (  ) / DNI (  )  Disposition: from home, will order PT/Rehab

## 2021-05-17 NOTE — H&P ADULT - NSHPLABSRESULTS_GEN_ALL_CORE
LABS:                          8.7    8.91  )-----------( 177      ( 17 May 2021 09:38 )             26.8     05-17    141  |  104  |  18  ----------------------------<  92  3.9   |  22  |  1.1    Ca    8.7      17 May 2021 09:38  Mg     1.8     05-17    TPro  5.6<L>  /  Alb  3.2<L>  /  TBili  0.7  /  DBili  x   /  AST  17  /  ALT  12  /  AlkPhos  115  05-17              Lactate Trend    CARDIAC MARKERS ( 17 May 2021 09:38 )  x     / 0.02 ng/mL / x     / x     / x          CAPILLARY BLOOD GLUCOSE            RADIOLOGY:    < from: CT Lower Extremity No Cont, Right (05.17.21 @ 16:34) >  IMPRESSION:  Acute oblique, nondisplaced periprosthetic fracture of the subtrochanteric right proximal femur.  < end of copied text >      EKGS:    < from: 12 Lead ECG (05.17.21 @ 10:25) >  Diagnosis Line Sinus rhythm with occasional Premature ventricular complexes  Prolonged QT  Abnormal ECG  < end of copied text >

## 2021-05-18 ENCOUNTER — TRANSCRIPTION ENCOUNTER (OUTPATIENT)
Age: 76
End: 2021-05-18

## 2021-05-18 LAB
ANION GAP SERPL CALC-SCNC: 15 MMOL/L — HIGH (ref 7–14)
BASOPHILS # BLD AUTO: 0.02 K/UL — SIGNIFICANT CHANGE UP (ref 0–0.2)
BASOPHILS NFR BLD AUTO: 0.2 % — SIGNIFICANT CHANGE UP (ref 0–1)
BUN SERPL-MCNC: 16 MG/DL — SIGNIFICANT CHANGE UP (ref 10–20)
CALCIUM SERPL-MCNC: 8.7 MG/DL — SIGNIFICANT CHANGE UP (ref 8.5–10.1)
CHLORIDE SERPL-SCNC: 104 MMOL/L — SIGNIFICANT CHANGE UP (ref 98–110)
CO2 SERPL-SCNC: 23 MMOL/L — SIGNIFICANT CHANGE UP (ref 17–32)
COVID-19 SPIKE DOMAIN AB INTERP: POSITIVE
COVID-19 SPIKE DOMAIN ANTIBODY RESULT: 2.9 U/ML — HIGH
CREAT SERPL-MCNC: 1 MG/DL — SIGNIFICANT CHANGE UP (ref 0.7–1.5)
EOSINOPHIL # BLD AUTO: 0.1 K/UL — SIGNIFICANT CHANGE UP (ref 0–0.7)
EOSINOPHIL NFR BLD AUTO: 1.2 % — SIGNIFICANT CHANGE UP (ref 0–8)
GLUCOSE SERPL-MCNC: 95 MG/DL — SIGNIFICANT CHANGE UP (ref 70–99)
HCT VFR BLD CALC: 27.3 % — LOW (ref 42–52)
HGB BLD-MCNC: 8.6 G/DL — LOW (ref 14–18)
IMM GRANULOCYTES NFR BLD AUTO: 0.6 % — HIGH (ref 0.1–0.3)
LACTATE SERPL-SCNC: 1.6 MMOL/L — SIGNIFICANT CHANGE UP (ref 0.7–2)
LYMPHOCYTES # BLD AUTO: 0.41 K/UL — LOW (ref 1.2–3.4)
LYMPHOCYTES # BLD AUTO: 5 % — LOW (ref 20.5–51.1)
MAGNESIUM SERPL-MCNC: 1.8 MG/DL — SIGNIFICANT CHANGE UP (ref 1.8–2.4)
MCHC RBC-ENTMCNC: 27.6 PG — SIGNIFICANT CHANGE UP (ref 27–31)
MCHC RBC-ENTMCNC: 31.5 G/DL — LOW (ref 32–37)
MCV RBC AUTO: 87.5 FL — SIGNIFICANT CHANGE UP (ref 80–94)
MONOCYTES # BLD AUTO: 0.62 K/UL — HIGH (ref 0.1–0.6)
MONOCYTES NFR BLD AUTO: 7.5 % — SIGNIFICANT CHANGE UP (ref 1.7–9.3)
NEUTROPHILS # BLD AUTO: 7.03 K/UL — HIGH (ref 1.4–6.5)
NEUTROPHILS NFR BLD AUTO: 85.5 % — HIGH (ref 42.2–75.2)
NRBC # BLD: 0 /100 WBCS — SIGNIFICANT CHANGE UP (ref 0–0)
PHOSPHATE SERPL-MCNC: 3.7 MG/DL — SIGNIFICANT CHANGE UP (ref 2.1–4.9)
PLATELET # BLD AUTO: 166 K/UL — SIGNIFICANT CHANGE UP (ref 130–400)
POTASSIUM SERPL-MCNC: 3.6 MMOL/L — SIGNIFICANT CHANGE UP (ref 3.5–5)
POTASSIUM SERPL-SCNC: 3.6 MMOL/L — SIGNIFICANT CHANGE UP (ref 3.5–5)
RBC # BLD: 3.12 M/UL — LOW (ref 4.7–6.1)
RBC # FLD: 19.3 % — HIGH (ref 11.5–14.5)
SARS-COV-2 IGG+IGM SERPL QL IA: 2.9 U/ML — HIGH
SARS-COV-2 IGG+IGM SERPL QL IA: POSITIVE
SODIUM SERPL-SCNC: 142 MMOL/L — SIGNIFICANT CHANGE UP (ref 135–146)
WBC # BLD: 8.23 K/UL — SIGNIFICANT CHANGE UP (ref 4.8–10.8)
WBC # FLD AUTO: 8.23 K/UL — SIGNIFICANT CHANGE UP (ref 4.8–10.8)

## 2021-05-18 PROCEDURE — 99233 SBSQ HOSP IP/OBS HIGH 50: CPT

## 2021-05-18 RX ADMIN — ESCITALOPRAM OXALATE 20 MILLIGRAM(S): 10 TABLET, FILM COATED ORAL at 11:16

## 2021-05-18 RX ADMIN — Medication 0.6 MILLIGRAM(S): at 11:16

## 2021-05-18 RX ADMIN — TRAMADOL HYDROCHLORIDE 50 MILLIGRAM(S): 50 TABLET ORAL at 21:04

## 2021-05-18 RX ADMIN — FLUDROCORTISONE ACETATE 0.1 MILLIGRAM(S): 0.1 TABLET ORAL at 18:04

## 2021-05-18 RX ADMIN — ENOXAPARIN SODIUM 40 MILLIGRAM(S): 100 INJECTION SUBCUTANEOUS at 21:05

## 2021-05-18 RX ADMIN — CLOPIDOGREL BISULFATE 75 MILLIGRAM(S): 75 TABLET, FILM COATED ORAL at 11:16

## 2021-05-18 RX ADMIN — TRAMADOL HYDROCHLORIDE 50 MILLIGRAM(S): 50 TABLET ORAL at 18:04

## 2021-05-18 RX ADMIN — Medication 25 MILLIGRAM(S): at 05:48

## 2021-05-18 RX ADMIN — Medication 25 MILLIGRAM(S): at 18:04

## 2021-05-18 RX ADMIN — TRAMADOL HYDROCHLORIDE 50 MILLIGRAM(S): 50 TABLET ORAL at 05:50

## 2021-05-18 RX ADMIN — TAMSULOSIN HYDROCHLORIDE 0.4 MILLIGRAM(S): 0.4 CAPSULE ORAL at 21:04

## 2021-05-18 RX ADMIN — TRAMADOL HYDROCHLORIDE 50 MILLIGRAM(S): 50 TABLET ORAL at 15:03

## 2021-05-18 RX ADMIN — MEGESTROL ACETATE 20 MILLIGRAM(S): 40 SUSPENSION ORAL at 18:05

## 2021-05-18 RX ADMIN — ATORVASTATIN CALCIUM 20 MILLIGRAM(S): 80 TABLET, FILM COATED ORAL at 21:04

## 2021-05-18 RX ADMIN — FLUDROCORTISONE ACETATE 0.1 MILLIGRAM(S): 0.1 TABLET ORAL at 05:48

## 2021-05-18 RX ADMIN — TRAMADOL HYDROCHLORIDE 50 MILLIGRAM(S): 50 TABLET ORAL at 05:51

## 2021-05-18 RX ADMIN — TRAMADOL HYDROCHLORIDE 50 MILLIGRAM(S): 50 TABLET ORAL at 09:36

## 2021-05-18 RX ADMIN — DESMOPRESSIN ACETATE 0.2 MILLIGRAM(S): 0.1 TABLET ORAL at 11:16

## 2021-05-18 NOTE — CONSULT NOTE ADULT - ASSESSMENT
IMPRESSION: Rehab of right femur fx    PRECAUTIONS: [   ] Cardiac  [   ] Respiratory  [   ] Seizures [   ] Contact Isolation  [   ] Droplet Isolation  [   ] Other    Weight Bearing Status: NWB RLE    RECOMMENDATION:    Out of Bed to Chair     DVT/Decubiti Prophylaxis    REHAB PLAN:     [  x  ] Bedside P/T 3-5 times a week   [    ]   Bedside O/T  2-3 times a week             [    ] Speech Therapy               [    ]  No Rehab Therapy Indicated   Conditioning/ROM                                    ADL  Bed Mobility                                               Conditioning/ROM  Transfers                                                     Bed Mobility  Sitting /Standing Balance                         Transfers                                        Gait Training                                               Sitting/Standing Balance  Stair Training [   ]Applicable                    Home equipment Eval                                                                        Splinting  [   ] Only      GOALS:   ADL   [    ]   Independent                    Transfers  [    ] Independent                          Ambulation  [    ] Independent     [  x   ] With device                            [    ]  CG                                                         [ x   ]  CG                                                                  [ x   ] CG                            [    ] Min A                                                   [    ] Min A                                                              [    ] Min  A          DISCHARGE PLAN:   [    ]  Good candidate for Intensive Rehabilitation/Hospital based                                             Will tolerate 3hrs Intensive Rehab Daily                                       [ x    ]  Short Term Rehab in Skilled Nursing Facility                            vs           [ x    ]  Home with Outpatient or VN services                                         [     ]  Possible Candidate for Intensive Hospital based Rehab

## 2021-05-18 NOTE — PROGRESS NOTE ADULT - SUBJECTIVE AND OBJECTIVE BOX
JODI GODINZE  76y, Male  Allergy: No Known Drug Allergies  Originally Entered as [HIVES] reaction to [shrimp] (Unknown)  shellfish (Other)      OVERNIGHT EVENTS:     LAUREANO  admitted for Acute oblique, nondisplaced periprosthetic fracture of the subtrochanteric right proximal femur.  Per Ortho: no surgery intervention  Per PM&R: rehab          Physical Exam: PHYSICAL EXAM:  GENERAL: NAD, Alert, 76y M, non-verbal  HEAD:  Atraumatic, Normocephalic  EYES: EOMI, conjunctiva clear and sclera white  NECK: Supple, No JVD  CHEST/LUNG: Clear to auscultation bilaterally; No wheeze; No crackles; No accessory muscles used  HEART: Regular rate and rhythm; No murmurs;   ABDOMEN: Hx of peg tube w/ well healed surgery scar. Soft, Nontender, Nondistended; Bowel sounds present; No guarding  EXTREMITIES:  2+ Peripheral Pulses, No cyanosis or edema  NEUROLOGY: non-focal        VITALS:  T(F): 97.9 (05-18-21 @ 13:53), Max: 98.2 (05-18-21 @ 05:23)  HR: 76 (05-18-21 @ 13:53)  BP: 115/64 (05-18-21 @ 13:53) (115/64 - 191/104)  RR: 18 (05-18-21 @ 13:53)  SpO2: 95% (05-18-21 @ 13:53)    TESTS & MEASUREMENTS:  Height (cm): 165.1 (05-17-21 @ 22:22)  Weight (kg): 53.9 (05-17-21 @ 22:22)  BMI (kg/m2): 19.8 (05-17-21 @ 22:22)    05-17-21 @ 07:01  -  05-18-21 @ 07:00  --------------------------------------------------------  IN: 120 mL / OUT: 0 mL / NET: 120 mL                            8.6    8.23  )-----------( 166      ( 18 May 2021 06:21 )             27.3       05-18    142  |  104  |  16  ----------------------------<  95  3.6   |  23  |  1.0    Ca    8.7      18 May 2021 06:21  Phos  3.7     05-18  Mg     1.8     05-18    TPro  5.6<L>  /  Alb  3.2<L>  /  TBili  0.7  /  DBili  x   /  AST  17  /  ALT  12  /  AlkPhos  115  05-17    LIVER FUNCTIONS - ( 17 May 2021 09:38 )  Alb: 3.2 g/dL / Pro: 5.6 g/dL / ALK PHOS: 115 U/L / ALT: 12 U/L / AST: 17 U/L / GGT: x           CARDIAC MARKERS ( 17 May 2021 09:38 )  x     / 0.02 ng/mL / x     / x     / x                    MEDICATIONS:  MEDICATIONS  (STANDING):  atorvastatin 20 milliGRAM(s) Oral at bedtime  clopidogrel Tablet 75 milliGRAM(s) Oral daily  colchicine 0.6 milliGRAM(s) Oral daily  desmopressin 0.2 milliGRAM(s) Oral daily  enoxaparin Injectable 40 milliGRAM(s) SubCutaneous at bedtime  escitalopram 20 milliGRAM(s) Oral daily  fludroCORTISONE 0.1 milliGRAM(s) Oral two times a day  megestrol 20 milliGRAM(s) Oral daily  metoprolol tartrate 25 milliGRAM(s) Oral two times a day  tamsulosin 0.4 milliGRAM(s) Oral at bedtime  traMADol 50 milliGRAM(s) Oral every 4 hours    MEDICATIONS  (PRN):  morphine  - Injectable 2 milliGRAM(s) IV Push every 4 hours PRN Severe Pain (7 - 10)  oxycodone    5 mG/acetaminophen 325 mG 1 Tablet(s) Oral every 4 hours PRN Moderate Pain (4 - 6)  senna 2 Tablet(s) Oral at bedtime PRN Constipation              HEALTH ISSUES - PROBLEM Dx:

## 2021-05-18 NOTE — OCCUPATIONAL THERAPY INITIAL EVALUATION ADULT - ADDITIONAL COMMENTS
spouse reports pt requiring increased assistance with adls and transfer within the last 2 weeks prior to admission

## 2021-05-18 NOTE — DISCHARGE NOTE NURSING/CASE MANAGEMENT/SOCIAL WORK - PATIENT PORTAL LINK FT
You can access the FollowMyHealth Patient Portal offered by [facility] by registering at the following website: [email or web address]. By joining [facility]’s FollowMyHealth portal, you will also be able to view your health information using other applications (apps) compatible with our system.

## 2021-05-18 NOTE — OCCUPATIONAL THERAPY INITIAL EVALUATION ADULT - NS ASR OT EQUIP NEEDS DISCH
OT spoke to spouse and CM re DME needs for home. OT recommends manual wheelchair and full body lift. Pt currently has commode and adjustable bed per spouse. Spouse provided information re ramp for home access./wheelchair

## 2021-05-18 NOTE — CONSULT NOTE ADULT - ASSESSMENT
77 yo M non verbal with pmh of stroke with right sided weakness, esophageal cancer currently being treated with Chemotherapy, s/p surgery presenting to the ED today with his wife bedside, after a fall at home; Pt found to have R non-displaced subtrochanteric fx. Seen by ortho and no plan for sx at this time; On EKG, patient has PVCs and labs showed troponin of 0.02    # PVC  - Patient asymptomatic  - Continue beta-blockers  - Check 2D echo (also in light of chemotherapy)    # Troponin of 0.02  - Only one set with no repeat  - No chest pain or signs or symptoms of ACS  - Repeat another set of cardiac enzymes  - As per the wife, patient had a pre-op cath last year that didn't show any significant abnormalities  - Get records from the patient's cardiologist in [place]  - Check 2D echo    # Slightly prolonged QTc  - Repeat EKG  - Check electrolytes and correct as needed  - Avoid QT prolonging agents    Outpatient follow-up with his cardiologist

## 2021-05-18 NOTE — PROGRESS NOTE ADULT - ASSESSMENT
· Assessment	  77 yo M non verbal with pmh of stroke with right sided weakness, esophageal cancer currently being treated with Chemotherapy presenting to the ED s/p mechanical fall.     # Acute oblique, nondisplaced fracture of the subtrochanteric right proximal femur  # s/p Mechanical fall  - NWB RLE   - pain control w/ tramadol around the clock (pt is non-verbal and unable to express pain); morphine 2 mg PRN for severe pain  - cont PT/OT  - Lovenox 40 qHS for DVT ppx  - per ortho: no surgery       # EKG: PVCs, non-sustained, QTc 517, asymptomatic  unknown Hx ischemic cardiopathy, Trop 0.02  neg AV block  f/o cardio    # Hx of esophageal CA  # s/p PEG tube removal  - per wife, pt can have regular diet  - f/u [facility]    # Stroke with right sided weakness  - c/w plavix     # HTN  # HLD  - c/w home meds  - DASH diet    # Chronic hyponatremia?  - Unclear why pt is on desmopressin and fludrocortisone as per wife, he had been taking it for years.   - c/w home meds    Diet: DASH diet    DVT ppx: Lovenox 40 mg qHS  Activity: Increase as tolerated  Code status: Full Code   Disposition: d/c rehab

## 2021-05-18 NOTE — OCCUPATIONAL THERAPY INITIAL EVALUATION ADULT - PERTINENT HX OF CURRENT PROBLEM, REHAB EVAL
75 yo M non verbal with pmh of stroke with right sided weakness, esophageal cancer currently being treated with Chemotherapy presenting to the ED today with his wife bedside, after a fall at home last night while the patient was walking to the bathroom with right hip pain. According to patient's wife he has not been ambulating much recently due to overall weakness. A baseline patient ambulates with an assistive device. No further complaints.

## 2021-05-18 NOTE — CHART NOTE - NSCHARTNOTEFT_GEN_A_CORE
Trauma tertiary Survey    Patient seen and examined. No complaints at this time.     PHYSICAL EXAM:  Craniofacial: Atraumatic, No deformity  Eyes: Pupil Size equal B/L and RTL. EOMI  Oropharynx: Atraumatic  Neck: Non-tender, No deformity, Trachea midline.  Chest: Equal breath sounds, non-tender, No deformity or crepitus  Heart: RSR, No murmurs, no rubs  Abdomen: Atraumatic, Non-tender, non-distended. No hepatosplenomegaly  Pelvis: Stable, non-tender, no deformity  : Atraumatic, no blood at the meatus or priapism  Back: Spine non-tender, Atraumatic  Extremities: Atraumatic, no deformities, normal pulses, moving all extremities. Tenderness at right hip  Neurologic: CN intact, Motor intact throughout. Sensation intact/normal throughout.  Psych: Mood and affect normal. Judgment and insight normal    < from: CT Head No Cont (05.17.21 @ 10:09) >      CT HEAD:  No acute intracranial pathology or hemorrhage. No acute calvarial fracture.    Chronic left MCA territory infarct.    CT CERVICAL SPINE:  No acute fracture or subluxation.    Multilevel degenerative changes as detailed above.    4 mm right apical lung nodule which is new since the prior chest CTA of 3/19/2020. Follow-up dedicated nonemergent chest CT is recommended.    < end of copied text >    < from: Xray Chest 1 View- PORTABLE-Urgent (Xray Chest 1 View- PORTABLE-Urgent .) (05.17.21 @ 11:04) >      Impression:    No radiographic evidence of acute cardiopulmonary disease.    Soft tissue opacity lateral to the thoracic spine may represent a dilated esophagus. Other etiologies are not excluded. PA and lateral views of the chest is suggested for further evaluation.    < end of copied text >    < from: Xray Hip 2-3 Views, Right (05.17.21 @ 11:04) >    Impression:    Subtle lucency along the proximal lateral shaft of the femur, suspicious for a fracture.    < end of copied text >    < from: Xray Femur 2 Views, Right (05.17.21 @ 11:04) >    Impression:    Subtle lucency along the proximal lateral shaft of the femur, suspiciousfor a fracture.    < end of copied text >    < from: Xray Knee 1 or 2 Views, Right (05.17.21 @ 11:05) >      IMPRESSION:    No acute displaced fracture.    < end of copied text >    < from: CT Lower Extremity No Cont, Right (05.17.21 @ 16:34) >    IMPRESSION:    Acute oblique, nondisplaced periprosthetic fracture of the subtrochanteric right proximal femur.    < end of copied text >    Pt with right nondisplaced periprosthetic fracture of subtrochanteric right proximal femur. Being managed NON-OP with ortho  All images/reports reviewed. No further traumatic work-up warranted.

## 2021-05-18 NOTE — CONSULT NOTE ADULT - SUBJECTIVE AND OBJECTIVE BOX
HPI:  77 yo M non verbal with pmh of stroke with right sided weakness, esophageal cancer currently being treated with Chemotherapy presenting to the ED today with his wife bedside, after a fall at home last night while the patient was walking to the bathroom with right hip pain. According to patient's wife he has not been ambulating much recently due to overall weakness. A baseline patient ambulates with an assistive device. No further complaints.     Vital Signs Last 24 Hrs  T(C): 36.6 (17 May 2021 20:44), Max: 36.8 (17 May 2021 09:05)  T(F): 97.9 (17 May 2021 20:44), Max: 98.3 (17 May 2021 09:05)  HR: 83 (17 May 2021 20:44) (71 - 83)  BP: 191/104 (17 May 2021 20:44) (165/88 - 191/104)  BP(mean): --  RR: 18 (17 May 2021 20:44) (18 - 18)  SpO2: 96% (17 May 2021 20:44) (96% - 98%)    In ED, trauma team consulted. Pt found to have R non-displaced subtrochanteric fx. Seen by ortho and no plan for sx at this time, NWB      PAST MEDICAL & SURGICAL HISTORY:  Non Hodgkin&#x27;s lymphoma  on observation    Esophageal cancer  s/p PEG   Port placement on 2/10 for neoadjuvant therapy    CVA (cerebral vascular accident)  with aphasia    History of BPH    Depression    Gout    Esophageal cancer  on chemo. s/p PEG tube    Cataract    History of hip replacement    PEG (percutaneous endoscopic gastrostomy) status  secondary to esophageal ca        Hospital Course:    TODAY'S SUBJECTIVE & REVIEW OF SYMPTOMS:     Constitutional WNL   Cardio WNL   Resp WNL   GI WNL  Heme WNL  Endo WNL  Skin WNL  MSK Weakness  Neuro WNL  Cognitive WNL  Psych WNL      MEDICATIONS  (STANDING):  atorvastatin 20 milliGRAM(s) Oral at bedtime  clopidogrel Tablet 75 milliGRAM(s) Oral daily  colchicine 0.6 milliGRAM(s) Oral daily  desmopressin 0.2 milliGRAM(s) Oral daily  enoxaparin Injectable 40 milliGRAM(s) SubCutaneous at bedtime  escitalopram 20 milliGRAM(s) Oral daily  fludroCORTISONE 0.1 milliGRAM(s) Oral two times a day  megestrol 20 milliGRAM(s) Oral daily  metoprolol tartrate 25 milliGRAM(s) Oral two times a day  tamsulosin 0.4 milliGRAM(s) Oral at bedtime  traMADol 50 milliGRAM(s) Oral every 4 hours    MEDICATIONS  (PRN):  morphine  - Injectable 2 milliGRAM(s) IV Push every 4 hours PRN Severe Pain (7 - 10)  oxycodone    5 mG/acetaminophen 325 mG 1 Tablet(s) Oral every 4 hours PRN Moderate Pain (4 - 6)  senna 2 Tablet(s) Oral at bedtime PRN Constipation      FAMILY HISTORY:  FH: colon cancer  father        Allergies    No Known Drug Allergies  Originally Entered as [HIVES] reaction to [shrimp] (Unknown)  shellfish (Other)    Intolerances        SOCIAL HISTORY:    [  ] Etoh  [  ] Smoking  [  ] Substance abuse     Home Environment:  [   ] Home Alone  [  x ] Lives with Family  [   ] Home Health Aid    Dwelling:  [   ] Apartment  [x   ] Private House  [   ] Adult Home  [   ] Skilled Nursing Facility      [   ] Short Term  [   ] Long Term  [ x  ] Stairs       Elevator [   ]    FUNCTIONAL STATUS PTA: (Check all that apply)  Ambulation: [  x  ]Independent    [   ] Dependent     [   ] Non-Ambulatory  Assistive Device: [  x ] SA Cane  [   ]  Q Cane  [   ] Walker  [   ]  Wheelchair  ADL : [  x ] Independent  [    ]  Dependent       Vital Signs Last 24 Hrs  T(C): 36.6 (18 May 2021 13:53), Max: 36.8 (18 May 2021 05:23)  T(F): 97.9 (18 May 2021 13:53), Max: 98.2 (18 May 2021 05:23)  HR: 76 (18 May 2021 13:53) (74 - 83)  BP: 115/64 (18 May 2021 13:53) (115/64 - 191/104)  BP(mean): --  RR: 18 (18 May 2021 13:53) (18 - 18)  SpO2: 95% (18 May 2021 13:53) (95% - 97%)      PHYSICAL EXAM: Awake & Alert, non verbal  GENERAL: NAD  HEAD:  Normocephalic  CHEST/LUNG: Clear   HEART: S1S2+  ABDOMEN: Soft, Nontender  EXTREMITIES:  no calf tenderness    NERVOUS SYSTEM:  Cranial Nerves 2-12 intact [   ] Abnormal  [   ]  ROM: WFL all extremities [   ]  Abnormal [ x  ]limited rle  Motor Strength: WFL all extremities  [   ]  Abnormal [  x ]limited rle  Sensation: intact to light touch [   ] Abnormal [   ]    FUNCTIONAL STATUS:  Bed Mobility: Independent [   ]  Supervision [   ]  Needs Assistance [ x  ]  N/A [   ]  Transfers: Independent [   ]  Supervision [   ]  Needs Assistance [ x  ]  N/A [   ]   Ambulation: Independent [   ]  Supervision [   ]  Needs Assistance [   ]  N/A [   ]  ADL: Independent [   ] Requires Assistance [   ] N/A [   ]      LABS:                        8.6    8.23  )-----------( 166      ( 18 May 2021 06:21 )             27.3     05-18    142  |  104  |  16  ----------------------------<  95  3.6   |  23  |  1.0    Ca    8.7      18 May 2021 06:21  Phos  3.7     05-18  Mg     1.8     05-18    TPro  5.6<L>  /  Alb  3.2<L>  /  TBili  0.7  /  DBili  x   /  AST  17  /  ALT  12  /  AlkPhos  115  05-17          RADIOLOGY & ADDITIONAL STUDIES:

## 2021-05-18 NOTE — OCCUPATIONAL THERAPY INITIAL EVALUATION ADULT - PLANNED THERAPY INTERVENTIONS, OT EVAL
ADL retraining/balance training/bed mobility training/parent/caregiver training.../strengthening/transfer training

## 2021-05-18 NOTE — CONSULT NOTE ADULT - SUBJECTIVE AND OBJECTIVE BOX
HPI:  77 yo M non verbal with pmh of stroke with right sided weakness, esophageal cancer currently being treated with Chemotherapy, s/p surgery presenting to the ED today with his wife bedside, after a fall at home last night while the patient was walking to the bathroom with right hip pain. According to patient's wife he has not been ambulating much recently due to overall weakness. A baseline patient ambulates with an assistive device. No further complaints.     Vital Signs Last 24 Hrs  T(C): 36.6 (17 May 2021 20:44), Max: 36.8 (17 May 2021 09:05)  T(F): 97.9 (17 May 2021 20:44), Max: 98.3 (17 May 2021 09:05)  HR: 83 (17 May 2021 20:44) (71 - 83)  BP: 191/104 (17 May 2021 20:44) (165/88 - 191/104)  BP(mean): --  RR: 18 (17 May 2021 20:44) (18 - 18)  SpO2: 96% (17 May 2021 20:44) (96% - 98%)    In ED, trauma team consulted. Pt found to have R non-displaced subtrochanteric fx. Seen by ortho and no plan for sx at this time.  (17 May 2021 22:02)      Cardiology  History obtained from wife at bedside.  Patient admitted s/p mechanical fall. No syncope.  Regarding his cardiac history, patient had a cardiomyopathy that has resolved. He follows-up with a cardiologist in [place]. Prior to his surgery for cancer, about 1 year ago, patient had a cath with no stents placed as per the wife.  Cardiology consulted for PVCs on EKG and troponin of 0.02. No chest pain, shortness of breath. palpitations or any other significant symptoms. Patient looks comfortable in bed    PAST MEDICAL & SURGICAL HISTORY  Non Hodgkin&#x27;s lymphoma  on observation    Esophageal cancer  s/p PEG   Port placement on 2/10 for neoadjuvant therapy    CVA (cerebral vascular accident)  with aphasia    History of BPH    Depression    Gout    Esophageal cancer  on chemo. s/p PEG tube    Cataract    History of hip replacement    PEG (percutaneous endoscopic gastrostomy) status  secondary to esophageal ca        FAMILY HISTORY:  FAMILY HISTORY:  FH: colon cancer  father        SOCIAL HISTORY:  []smoker  []Alcohol  []Drug    ALLERGIES:  No Known Drug Allergies  Originally Entered as [HIVES] reaction to [shrimp] (Unknown)  shellfish (Other)      MEDICATIONS:  MEDICATIONS  (STANDING):  atorvastatin 20 milliGRAM(s) Oral at bedtime  clopidogrel Tablet 75 milliGRAM(s) Oral daily  colchicine 0.6 milliGRAM(s) Oral daily  desmopressin 0.2 milliGRAM(s) Oral daily  enoxaparin Injectable 40 milliGRAM(s) SubCutaneous at bedtime  escitalopram 20 milliGRAM(s) Oral daily  fludroCORTISONE 0.1 milliGRAM(s) Oral two times a day  megestrol 20 milliGRAM(s) Oral daily  metoprolol tartrate 25 milliGRAM(s) Oral two times a day  tamsulosin 0.4 milliGRAM(s) Oral at bedtime  traMADol 50 milliGRAM(s) Oral every 4 hours    MEDICATIONS  (PRN):  morphine  - Injectable 2 milliGRAM(s) IV Push every 4 hours PRN Severe Pain (7 - 10)  oxycodone    5 mG/acetaminophen 325 mG 1 Tablet(s) Oral every 4 hours PRN Moderate Pain (4 - 6)  senna 2 Tablet(s) Oral at bedtime PRN Constipation      HOME MEDICATIONS:  Home Medications:  atorvastatin 20 mg oral tablet: 1 tab(s) orally once a day (17 May 2021 21:51)  colchicine 0.6 mg oral tablet: 1 tab(s) orally once a day (17 May 2021 21:51)  desmopressin 0.2 mg oral tablet: orally once a day (17 May 2021 21:51)  escitalopram 20 mg oral tablet: 1 tab(s) orally once a day (17 May 2021 21:51)  fludrocortisone 0.1 mg oral tablet: 1 tab(s) orally 2 times a day (17 May 2021 21:51)  megestrol 20 mg oral tablet: 1 tab(s) orally once a day (17 May 2021 21:51)  metoprolol tartrate 25 mg oral tablet: 1 tab(s) orally 2 times a day (17 May 2021 21:51)  Plavix 75 mg oral tablet: 1 tab(s) orally once a day (17 May 2021 21:51)  tamsulosin 0.4 mg oral capsule: 1 cap(s) orally once a day (17 May 2021 21:51)      VITALS:   T(F): 97.9 (05-18 @ 13:53), Max: 98.3 (05-17 @ 09:05)  HR: 76 (05-18 @ 13:53) (71 - 83)  BP: 115/64 (05-18 @ 13:53) (115/64 - 191/104)  BP(mean): --  RR: 18 (05-18 @ 13:53) (18 - 18)  SpO2: 95% (05-18 @ 13:53) (95% - 98%)    I&O's Summary    17 May 2021 07:01  -  18 May 2021 07:00  --------------------------------------------------------  IN: 120 mL / OUT: 0 mL / NET: 120 mL        REVIEW OF SYSTEMS:  CONSTITUTIONAL: No weakness, fevers or chills  EYES: No visual changes  ENT: No vertigo or throat pain   NECK: No pain or stiffness  RESPIRATORY: No cough, wheezing, hemoptysis; No shortness of breath  CARDIOVASCULAR: No chest pain or palpitations  GASTROINTESTINAL: No abdominal or epigastric pain. No nausea, vomiting, or hematemesis; No diarrhea or constipation. No melena or hematochezia.  GENITOURINARY: No dysuria, frequency or hematuria  NEUROLOGICAL: No numbness or weakness  SKIN: No itching, no rashes  MSK: No pain    PHYSICAL EXAM:  NEURO: patient is awake , alert; aphasic  GEN: Not in acute distress  NECK: no thyroid enlargement, no JVD  LUNGS: Clear to auscultation bilaterally   CARDIOVASCULAR: S1/S2 present, RRR  ABD: Soft, non-tender, non-distended  EXT: No MARISSA  SKIN: Intact    LABS:                        8.6    8.23  )-----------( 166      ( 18 May 2021 06:21 )             27.3     05-18    142  |  104  |  16  ----------------------------<  95  3.6   |  23  |  1.0    Ca    8.7      18 May 2021 06:21  Phos  3.7     05-18  Mg     1.8     05-18    TPro  5.6<L>  /  Alb  3.2<L>  /  TBili  0.7  /  DBili  x   /  AST  17  /  ALT  12  /  AlkPhos  115  05-17      Lactate, Blood: 1.6 mmol/L (05-18-21 @ 06:21)    CARDIAC MARKERS ( 17 May 2021 09:38 )  x     / 0.02 ng/mL / x     / x     / x            Troponin trend:            RADIOLOGY:  -CXR:  < from: Xray Chest 1 View- PORTABLE-Urgent (Xray Chest 1 View- PORTABLE-Urgent .) (05.17.21 @ 11:04) >  Impression:    No radiographic evidence of acute cardiopulmonary disease.    < end of copied text >    -TTE:  -CCTA:  -STRESS TEST:  -CATHETERIZATION:    ECG: NSR, PVC

## 2021-05-18 NOTE — OCCUPATIONAL THERAPY INITIAL EVALUATION ADULT - ADL RETRAINING, OT EVAL
Breathing spontaneous and unlabored. Breath sounds clear and equal bilaterally with regular rhythm.
Pt will increase toileting to max assistance by discharge

## 2021-05-19 LAB
ANION GAP SERPL CALC-SCNC: 12 MMOL/L — SIGNIFICANT CHANGE UP (ref 7–14)
BUN SERPL-MCNC: 15 MG/DL — SIGNIFICANT CHANGE UP (ref 10–20)
CALCIUM SERPL-MCNC: 8.4 MG/DL — LOW (ref 8.5–10.1)
CHLORIDE SERPL-SCNC: 102 MMOL/L — SIGNIFICANT CHANGE UP (ref 98–110)
CO2 SERPL-SCNC: 21 MMOL/L — SIGNIFICANT CHANGE UP (ref 17–32)
CREAT SERPL-MCNC: 0.9 MG/DL — SIGNIFICANT CHANGE UP (ref 0.7–1.5)
GLUCOSE SERPL-MCNC: 82 MG/DL — SIGNIFICANT CHANGE UP (ref 70–99)
HCT VFR BLD CALC: 27.3 % — LOW (ref 42–52)
HGB BLD-MCNC: 8.7 G/DL — LOW (ref 14–18)
MCHC RBC-ENTMCNC: 27.8 PG — SIGNIFICANT CHANGE UP (ref 27–31)
MCHC RBC-ENTMCNC: 31.9 G/DL — LOW (ref 32–37)
MCV RBC AUTO: 87.2 FL — SIGNIFICANT CHANGE UP (ref 80–94)
NRBC # BLD: 0 /100 WBCS — SIGNIFICANT CHANGE UP (ref 0–0)
PLATELET # BLD AUTO: 157 K/UL — SIGNIFICANT CHANGE UP (ref 130–400)
POTASSIUM SERPL-MCNC: 4.8 MMOL/L — SIGNIFICANT CHANGE UP (ref 3.5–5)
POTASSIUM SERPL-SCNC: 4.8 MMOL/L — SIGNIFICANT CHANGE UP (ref 3.5–5)
RBC # BLD: 3.13 M/UL — LOW (ref 4.7–6.1)
RBC # FLD: 18.6 % — HIGH (ref 11.5–14.5)
SODIUM SERPL-SCNC: 135 MMOL/L — SIGNIFICANT CHANGE UP (ref 135–146)
WBC # BLD: 7.43 K/UL — SIGNIFICANT CHANGE UP (ref 4.8–10.8)
WBC # FLD AUTO: 7.43 K/UL — SIGNIFICANT CHANGE UP (ref 4.8–10.8)

## 2021-05-19 PROCEDURE — 93010 ELECTROCARDIOGRAM REPORT: CPT

## 2021-05-19 PROCEDURE — 99232 SBSQ HOSP IP/OBS MODERATE 35: CPT

## 2021-05-19 RX ADMIN — Medication 25 MILLIGRAM(S): at 17:16

## 2021-05-19 RX ADMIN — TRAMADOL HYDROCHLORIDE 50 MILLIGRAM(S): 50 TABLET ORAL at 06:11

## 2021-05-19 RX ADMIN — CLOPIDOGREL BISULFATE 75 MILLIGRAM(S): 75 TABLET, FILM COATED ORAL at 11:04

## 2021-05-19 RX ADMIN — TRAMADOL HYDROCHLORIDE 50 MILLIGRAM(S): 50 TABLET ORAL at 21:45

## 2021-05-19 RX ADMIN — FLUDROCORTISONE ACETATE 0.1 MILLIGRAM(S): 0.1 TABLET ORAL at 17:16

## 2021-05-19 RX ADMIN — TRAMADOL HYDROCHLORIDE 50 MILLIGRAM(S): 50 TABLET ORAL at 17:18

## 2021-05-19 RX ADMIN — MEGESTROL ACETATE 20 MILLIGRAM(S): 40 SUSPENSION ORAL at 11:05

## 2021-05-19 RX ADMIN — DESMOPRESSIN ACETATE 0.2 MILLIGRAM(S): 0.1 TABLET ORAL at 11:06

## 2021-05-19 RX ADMIN — FLUDROCORTISONE ACETATE 0.1 MILLIGRAM(S): 0.1 TABLET ORAL at 06:09

## 2021-05-19 RX ADMIN — TRAMADOL HYDROCHLORIDE 50 MILLIGRAM(S): 50 TABLET ORAL at 11:03

## 2021-05-19 RX ADMIN — TRAMADOL HYDROCHLORIDE 50 MILLIGRAM(S): 50 TABLET ORAL at 15:00

## 2021-05-19 RX ADMIN — ATORVASTATIN CALCIUM 20 MILLIGRAM(S): 80 TABLET, FILM COATED ORAL at 21:44

## 2021-05-19 RX ADMIN — Medication 0.6 MILLIGRAM(S): at 11:03

## 2021-05-19 RX ADMIN — Medication 25 MILLIGRAM(S): at 06:09

## 2021-05-19 RX ADMIN — ESCITALOPRAM OXALATE 20 MILLIGRAM(S): 10 TABLET, FILM COATED ORAL at 11:03

## 2021-05-19 RX ADMIN — ENOXAPARIN SODIUM 40 MILLIGRAM(S): 100 INJECTION SUBCUTANEOUS at 21:44

## 2021-05-19 RX ADMIN — TAMSULOSIN HYDROCHLORIDE 0.4 MILLIGRAM(S): 0.4 CAPSULE ORAL at 21:44

## 2021-05-19 NOTE — PROGRESS NOTE ADULT - ASSESSMENT
· Assessment	  77 yo M non verbal with pmh of stroke with right sided weakness, esophageal cancer currently being treated with Chemotherapy presenting to the ED s/p mechanical fall.     # Acute oblique, nondisplaced fracture of the subtrochanteric right proximal femur  # s/p Mechanical fall  - NWB RLE   - pain control w/ tramadol around the clock (pt is non-verbal and unable to express pain); morphine 2 mg PRN for severe pain  - cont PT/OT  - Lovenox 40 qHS for DVT ppx  - per ortho: no surgery       # EKG: PVCs, non-sustained, QTc 517, asymptomatic  unknown Hx ischemic cardiopathy, Trop 0.02  neg AV block  f/o cardio    # Hx of esophageal CA  # s/p PEG tube removal      # Stroke with right sided weakness  - c/w plavix     # HTN  # HLD  - c/w home meds  - DASH diet    # Chronic hyponatremia?  - Unclear why pt is on desmopressin and fludrocortisone as per wife, he had been taking it for years.   - c/w home meds    Diet: DASH diet    DVT ppx: Lovenox 40 mg qHS  Activity: Increase as tolerated  Code status: Full Code

## 2021-05-19 NOTE — PHYSICAL THERAPY INITIAL EVALUATION ADULT - LEVEL OF INDEPENDENCE, REHAB EVAL
pt reported increased pain rolling to left , less rolling to right/moderate assist (50% patients effort)

## 2021-05-19 NOTE — PROGRESS NOTE ADULT - SUBJECTIVE AND OBJECTIVE BOX
Admitted for Acute oblique, nondisplaced periprosthetic fracture of the subtrochanteric right proximal femur.  Per Ortho: no surgery intervention    S  Comfortable  No pain   eating fine         Physical Exam: PHYSICAL EXAM:  GENERAL: NAD, Alert, 76y M, non-verbal  HEAD:  Atraumatic, Normocephalic  EYES: EOMI, conjunctiva clear and sclera white  NECK: Supple, No JVD  CHEST/LUNG: Clear to auscultation bilaterally; No wheeze; No crackles; No accessory muscles used  HEART: Regular rate and rhythm; No murmurs;   ABDOMEN: Hx of peg tube w/ well healed surgery scar. Soft, Nontender, Nondistended; Bowel sounds present; No guarding  EXTREMITIES:  R LE is tender   NEUROLOGY: non-focal        VITALS:  T(F): 97.9 (05-18-21 @ 13:53), Max: 98.2 (05-18-21 @ 05:23)  HR: 76 (05-18-21 @ 13:53)  BP: 115/64 (05-18-21 @ 13:53) (115/64 - 191/104)  RR: 18 (05-18-21 @ 13:53)  SpO2: 95% (05-18-21 @ 13:53)    TESTS & MEASUREMENTS:  Height (cm): 165.1 (05-17-21 @ 22:22)  Weight (kg): 53.9 (05-17-21 @ 22:22)  BMI (kg/m2): 19.8 (05-17-21 @ 22:22)    05-17-21 @ 07:01  -  05-18-21 @ 07:00  --------------------------------------------------------  IN: 120 mL / OUT: 0 mL / NET: 120 mL                            8.6    8.23  )-----------( 166      ( 18 May 2021 06:21 )             27.3       05-18    142  |  104  |  16  ----------------------------<  95  3.6   |  23  |  1.0    Ca    8.7      18 May 2021 06:21  Phos  3.7     05-18  Mg     1.8     05-18    TPro  5.6<L>  /  Alb  3.2<L>  /  TBili  0.7  /  DBili  x   /  AST  17  /  ALT  12  /  AlkPhos  115  05-17    LIVER FUNCTIONS - ( 17 May 2021 09:38 )  Alb: 3.2 g/dL / Pro: 5.6 g/dL / ALK PHOS: 115 U/L / ALT: 12 U/L / AST: 17 U/L / GGT: x           CARDIAC MARKERS ( 17 May 2021 09:38 )  x     / 0.02 ng/mL / x     / x     / x                    MEDICATIONS:  MEDICATIONS  (STANDING):  atorvastatin 20 milliGRAM(s) Oral at bedtime  clopidogrel Tablet 75 milliGRAM(s) Oral daily  colchicine 0.6 milliGRAM(s) Oral daily  desmopressin 0.2 milliGRAM(s) Oral daily  enoxaparin Injectable 40 milliGRAM(s) SubCutaneous at bedtime  escitalopram 20 milliGRAM(s) Oral daily  fludroCORTISONE 0.1 milliGRAM(s) Oral two times a day  megestrol 20 milliGRAM(s) Oral daily  metoprolol tartrate 25 milliGRAM(s) Oral two times a day  tamsulosin 0.4 milliGRAM(s) Oral at bedtime  traMADol 50 milliGRAM(s) Oral every 4 hours    MEDICATIONS  (PRN):  morphine  - Injectable 2 milliGRAM(s) IV Push every 4 hours PRN Severe Pain (7 - 10)  oxycodone    5 mG/acetaminophen 325 mG 1 Tablet(s) Oral every 4 hours PRN Moderate Pain (4 - 6)  senna 2 Tablet(s) Oral at bedtime PRN Constipation              HEALTH ISSUES - PROBLEM Dx:

## 2021-05-19 NOTE — PHYSICAL THERAPY INITIAL EVALUATION ADULT - PERTINENT HX OF CURRENT PROBLEM, REHAB EVAL
pt admitted s/p fall at home found to have right periprosthetic fx, hx CVA and esophageal ca. Ortho has determined that no surgery is warranted at this time.

## 2021-05-19 NOTE — PHYSICAL THERAPY INITIAL EVALUATION ADULT - GENERAL OBSERVATIONS, REHAB EVAL
Pt encountered in am with OT Magan and pt's wife in room. As per OT, attempted to mobilize pt in bed however pt immediately wincing upon RLE movement and unable to tolerate change in position/mobility training at this time. PT educated pt and pt's wife on role of b/s PT, PT POC, RLE NWB stats, and general rehab goals, Notified RN of pt indication of RLE pain (pt non verbal at baseline). RN aware and will provide pt with pain medication when due. PT to f/u when appropriate.
9:15-10:00 pt encountered in bed, spouse and OT Magan present. Pt is non verbal but understands commands.  He was able to perform bed mobility and sit to stand transfer with assist of PT and RW.

## 2021-05-19 NOTE — CHART NOTE - NSCHARTNOTEFT_GEN_A_CORE
cardiology recs appreciated  repeat EKG in chart, no acute ischemic changes  ECHO pending    called his doctor in [place] Dr. Huber Baeza  to obtain cath report from last year. office sent it to our unit however none of the fax machine working at this time.   try again tomorrow morning.     wife at bedside, complaining pt is having urinary incontinence x 2 episodes today. pt states he does not have urge to pee but finds wetting his bed. usually uses urinal at bedside.  pt currently denies any back pain, new numbness or weakness.   will get U/a to r/o UTI and also urology consult. may need to be ruled out for mets. cardiology recs appreciated  repeat EKG in chart, no acute ischemic changes  ECHO pending    called his doctor in [place] Dr. Huber Baeza  to obtain cath report from last year. office sent it to our unit however none of the fax machine working at this time.   try again tomorrow morning.     wife at bedside, complaining pt is having urinary incontinence x 2 episodes today. pt states he does not have urge to pee but finds wetting his bed. usually uses urinal at bedside.  pt currently denies any back pain, new numbness or weakness. rectal tone intact.   will get U/a to r/o UTI and also urology consult.

## 2021-05-19 NOTE — CONSULT NOTE ADULT - SUBJECTIVE AND OBJECTIVE BOX
Patient is a 77 y/o  Male who presents with a chief complaint of s/p mechanical fall (19 May 2021 15:36)      HPI:  77 yo M non verbal with pmh of stroke with right sided weakness, esophageal cancer currently being treated with Chemotherapy presenting to the ED today with his wife bedside, after a fall at home last night while the patient was walking to the bathroom with right hip pain. According to patient's wife he has not been ambulating much recently due to overall weakness. A baseline patient ambulates with an assistive device. No further complaints.     In ED, trauma team consulted. Pt found to have R non-displaced subtrochanteric fx. Seen by ortho and no plan for sx at this time.  (17 May 2021 22:02)    Urology: Wife at bedside, pt is non verbal , hx obtained from her and the chart. Pt with c/o prior to presentation, c/o decreased FOS, no frequency, nocturia x 2-3, now with new onset urge incontinence 4 episodes since Fridays.  Pt's wife states that he was ambulatory prior to fx. Occasional PV fullness, No PV dribbling, No dysuria or hematuria + hesitancy     PAST MEDICAL & SURGICAL HISTORY:  Non Hodgkin&#x27;s lymphoma  on observation    Esophageal cancer  s/p PEG   Port placement on 2/10 for neoadjuvant therapy    CVA (cerebral vascular accident)  with aphasia    History of BPH    Depression    Gout    Esophageal cancer  on chemo. s/p PEG tube    Cataract    History of hip replacement    PEG (percutaneous endoscopic gastrostomy) status  secondary to esophageal ca        REVIEW OF SYSTEMS:    [x ] a 10 point review of systems was negative except where noted    MEDICATIONS  (STANDING):  atorvastatin 20 milliGRAM(s) Oral at bedtime  clopidogrel Tablet 75 milliGRAM(s) Oral daily  colchicine 0.6 milliGRAM(s) Oral daily  desmopressin 0.2 milliGRAM(s) Oral daily  enoxaparin Injectable 40 milliGRAM(s) SubCutaneous at bedtime  escitalopram 20 milliGRAM(s) Oral daily  fludroCORTISONE 0.1 milliGRAM(s) Oral two times a day  megestrol 20 milliGRAM(s) Oral daily  metoprolol tartrate 25 milliGRAM(s) Oral two times a day  tamsulosin 0.4 milliGRAM(s) Oral at bedtime  traMADol 50 milliGRAM(s) Oral every 4 hours    MEDICATIONS  (PRN):  morphine  - Injectable 2 milliGRAM(s) IV Push every 4 hours PRN Severe Pain (7 - 10)  oxycodone    5 mG/acetaminophen 325 mG 1 Tablet(s) Oral every 4 hours PRN Moderate Pain (4 - 6)  senna 2 Tablet(s) Oral at bedtime PRN Constipation      Allergies    No Known Drug Allergies  Originally Entered as [HIVES] reaction to [shrimp] (Unknown)  shellfish (Other)      SOCIAL HISTORY: No illicit drug use    FAMILY HISTORY:  FH: colon cancer  father        Vital Signs Last 24 Hrs  T(C): 36.5 (19 May 2021 14:00), Max: 36.8 (19 May 2021 06:04)  T(F): 97.7 (19 May 2021 14:00), Max: 98.2 (19 May 2021 06:04)  HR: 92 (19 May 2021 14:00) (75 - 92)  BP: 99/61 (19 May 2021 14:00) (99/61 - 139/81)  RR: 18 (19 May 2021 14:00) (18 - 18)  SpO2: 98% (19 May 2021 14:00) (97% - 98%)      PHYSICAL EXAM:    Constitutional: NAD, well-developed, well nourished, non verbal  HEENT: NC/AT  Neck: no pain, FROM  Back: No CVA tenderness  Respiratory: No accessory respiratory muscle use  Abd: Soft, NT/ND  no organomegally  no hernia + palpable fem-fem graph.  bladder scan pvr 121 cc  : NL m circumcised, no d/c NL meatus, B/L atrophic tender testicles x 2   KANNAN: deferred 2/2 hip fx.  Extremities: no edema  Neurological: A/O x 3  Psychiatric: Normal mood, normal affect  Skin: No rashes    I&O's Summary    18 May 2021 07:01  -  19 May 2021 07:00  --------------------------------------------------------  IN: 0 mL / OUT: 303 mL / NET: -303 mL        LABS:                        8.7    7.43  )-----------( 157      ( 19 May 2021 07:02 )             27.3     05-19    135  |  102  |  15  ----------------------------<  82  4.8   |  21  |  0.9    Ca    8.4<L>      19 May 2021 07:02  Phos  3.7     05-18  Mg     1.8     05-18          Urine Culture: no recent cx        RADIOLOGY & ADDITIONAL STUDIES:    No recent urologic films.

## 2021-05-19 NOTE — CONSULT NOTE ADULT - ASSESSMENT
77 y/o m with 4 episodes of urge incontinence    A) LUTS  urge incontinence  r/o UTI  esophageal ca  non verbal  BPH followed with Dr. Alejandro Nickerson    P) Send urine for urinalysis and culture.  renal/bladder sonogram with PVR to r/o hydronephrosis and incomplete bladder emptying  transabdominal prostatic sonogram to determine size of prostate.  pt will need OP urodynamics when medically optimal  continue Flomax  will d/w attending   75 y/o m with 4 episodes of urge incontinence    A) LUTS  urge incontinence  r/o UTI  esophageal ca  non verbal  BPH followed with Dr. Alejandro Nickerson    P) Send urine for urinalysis and culture.  renal/bladder sonogram with PVR to r/o hydronephrosis and incomplete bladder emptying  scrotal sonogram to r/o orchitis  transabdominal prostatic sonogram to determine size of prostate.  pt will need OP urodynamics when medically optimal  continue Flomax  will d/w attending

## 2021-05-20 DIAGNOSIS — Z71.89 OTHER SPECIFIED COUNSELING: ICD-10-CM

## 2021-05-20 DIAGNOSIS — R52 PAIN, UNSPECIFIED: ICD-10-CM

## 2021-05-20 DIAGNOSIS — Z51.5 ENCOUNTER FOR PALLIATIVE CARE: ICD-10-CM

## 2021-05-20 DIAGNOSIS — C15.9 MALIGNANT NEOPLASM OF ESOPHAGUS, UNSPECIFIED: ICD-10-CM

## 2021-05-20 DIAGNOSIS — R53.0 NEOPLASTIC (MALIGNANT) RELATED FATIGUE: ICD-10-CM

## 2021-05-20 DIAGNOSIS — I63.9 CEREBRAL INFARCTION, UNSPECIFIED: ICD-10-CM

## 2021-05-20 DIAGNOSIS — K59.00 CONSTIPATION, UNSPECIFIED: ICD-10-CM

## 2021-05-20 DIAGNOSIS — S72.21XA DISPLACED SUBTROCHANTERIC FRACTURE OF RIGHT FEMUR, INITIAL ENCOUNTER FOR CLOSED FRACTURE: ICD-10-CM

## 2021-05-20 PROCEDURE — 76870 US EXAM SCROTUM: CPT | Mod: 26

## 2021-05-20 PROCEDURE — 99497 ADVNCD CARE PLAN 30 MIN: CPT | Mod: 25

## 2021-05-20 PROCEDURE — 76856 US EXAM PELVIC COMPLETE: CPT | Mod: 26

## 2021-05-20 PROCEDURE — 99232 SBSQ HOSP IP/OBS MODERATE 35: CPT

## 2021-05-20 PROCEDURE — 93306 TTE W/DOPPLER COMPLETE: CPT | Mod: 26

## 2021-05-20 PROCEDURE — 76775 US EXAM ABDO BACK WALL LIM: CPT | Mod: 26

## 2021-05-20 PROCEDURE — 99223 1ST HOSP IP/OBS HIGH 75: CPT

## 2021-05-20 PROCEDURE — 99222 1ST HOSP IP/OBS MODERATE 55: CPT

## 2021-05-20 RX ADMIN — FLUDROCORTISONE ACETATE 0.1 MILLIGRAM(S): 0.1 TABLET ORAL at 05:17

## 2021-05-20 RX ADMIN — TAMSULOSIN HYDROCHLORIDE 0.4 MILLIGRAM(S): 0.4 CAPSULE ORAL at 21:22

## 2021-05-20 RX ADMIN — ENOXAPARIN SODIUM 40 MILLIGRAM(S): 100 INJECTION SUBCUTANEOUS at 21:22

## 2021-05-20 RX ADMIN — OXYCODONE AND ACETAMINOPHEN 1 TABLET(S): 5; 325 TABLET ORAL at 12:00

## 2021-05-20 RX ADMIN — ATORVASTATIN CALCIUM 20 MILLIGRAM(S): 80 TABLET, FILM COATED ORAL at 21:22

## 2021-05-20 RX ADMIN — CLOPIDOGREL BISULFATE 75 MILLIGRAM(S): 75 TABLET, FILM COATED ORAL at 11:36

## 2021-05-20 RX ADMIN — ESCITALOPRAM OXALATE 20 MILLIGRAM(S): 10 TABLET, FILM COATED ORAL at 11:36

## 2021-05-20 RX ADMIN — OXYCODONE AND ACETAMINOPHEN 1 TABLET(S): 5; 325 TABLET ORAL at 11:34

## 2021-05-20 RX ADMIN — FLUDROCORTISONE ACETATE 0.1 MILLIGRAM(S): 0.1 TABLET ORAL at 17:01

## 2021-05-20 RX ADMIN — DESMOPRESSIN ACETATE 0.2 MILLIGRAM(S): 0.1 TABLET ORAL at 11:36

## 2021-05-20 RX ADMIN — Medication 25 MILLIGRAM(S): at 05:17

## 2021-05-20 RX ADMIN — MEGESTROL ACETATE 20 MILLIGRAM(S): 40 SUSPENSION ORAL at 11:36

## 2021-05-20 RX ADMIN — Medication 0.6 MILLIGRAM(S): at 11:36

## 2021-05-20 NOTE — CONSULT NOTE ADULT - PROBLEM SELECTOR RECOMMENDATION 4
will follow  full code   will continue GO converstation will follow  full code   will continue GOC conversation if continues to take PRN opioids, switch senna to standing

## 2021-05-20 NOTE — CONSULT NOTE ADULT - PROBLEM SELECTOR RECOMMENDATION 8
see above, in sum:   in light of multiple comorbidities and oncology was going to have discussion as per wife - hospice consult  will continue GOC tomorrow

## 2021-05-20 NOTE — CONSULT NOTE ADULT - ATTENDING COMMENTS
Orthopedic Attending  Patient seen and examined.  PMHx, Psurg Hx, Medications and Allergies reviewed.  X-rays and CT reviewed.  Agree with findings, assessment and plan.  Impression: minimally displaced Titus b rosana-prosthetic fracture in a patient with multiple medical problems.  Surgical fixation would not improve weight bearing.  Alternative is revision which is not ideal in light of the patients on going medical issues.  Recommend non-operative management with protected  (touch down) weight bearing for 6 weeks.
Trauma Attending H&P Attestation    Patient seen and evaluated with the trauma team in the trauma bay upon arrival. All pertinent labs and radiographic imaging reviewed, pending final reports. Outpatient medications reviewed, including the presence of anticoagulants, if applicable. I agree with the resident's note above, including the physical exam findings, assessment and plan as documented with the following adjustments.     Trauma Level: consult  Activation by:  ED physician  Intubated in Field? No  Intubated in ED? No  Intubated in Trauma [place]? No    JODI GODINEZ Patient is a 76y old  Male who presents with a chief complaint of hip pain (pointing towards), with GCS 12  upon arrival. Patient averbal after CVA and as per witnesses fell to his right side  while goint to the restroom    Allergies    No Known Drug Allergies  Originally Entered as [HIVES] reaction to [shrimp] (Unknown)  shellfish (Other)    Intolerances      Vital Signs Last 24 Hrs  T(C): 36.8 (17 May 2021 09:05), Max: 36.8 (17 May 2021 09:05)  T(F): 98.3 (17 May 2021 09:05), Max: 98.3 (17 May 2021 09:05)  HR: 71 (17 May 2021 09:05) (71 - 71)  BP: 165/88 (17 May 2021 09:05) (165/88 - 165/88)  BP(mean): --  RR: 18 (17 May 2021 09:05) (18 - 18)  SpO2: 98% (17 May 2021 09:05) (98% - 98%)    Assessment: Periprosthetic hip fracture    PLAN  - discussed with ortho -> no further intervention from their stand point.  - due to low energy injury which will be managed none OP and presence extensive medical co morbidities patient may benefit for the medical for further addressing his medical co morbidities   - supportive care  - GI/DVT prophylaxis  - pain management  - follow up consults  - repeat studies as needed         Melanie Mann MD, FACS  Trauma/ACS/SCC Attending
agree with above  rec imaging of bladder/prostate  as well as scrotum to r/o orchitis  continue flomax  follow urine studies
Patient seen and examined at bedside with wife.  Had been receiving treatment at [facility].  Patient had fall with fracture.  Continue percocet and PRN morphine as above.  GOC discussion ongoing with wife, hospice consult placed.  Will continue to follow.

## 2021-05-20 NOTE — CONSULT NOTE ADULT - CONSULT REQUESTED DATE/TIME
17-May-2021 18:36
17-May-2021 20:30
20-May-2021 12:08
18-May-2021 14:38
18-May-2021 19:59
19-May-2021 17:46

## 2021-05-20 NOTE — CONSULT NOTE ADULT - CONSULT REASON
[employer] Wife interested in palliative Vs Hospic Care
right periprosthetic hip fracture
Consult
2 PVCs on EKG and trop of 0.02
fall
incontinence

## 2021-05-20 NOTE — CONSULT NOTE ADULT - PROBLEM SELECTOR RECOMMENDATION 5
acute in nature due to fracture  continue per primary team  co In the setting of metastatic disease and overall poor conditioning.  -continue treatment per primary team  -no need for pharmacological treatment of fatigue at this time

## 2021-05-20 NOTE — HOSPICE CARE NOTE - CONVESATION DETAILS
Consult completed via phone call to patient's spouse Renu. Reviewed hospice philosophy and services. Renu reports patient recently broke his femur and is bed bound.  Renu seeking comfort care and HHA services. Reinforced that we are a support service and can not provide routine HHA services. Discussed private hire as an option. Renu to discuss further with family. Requesting call back from hospice tomorrow.

## 2021-05-20 NOTE — CONSULT NOTE ADULT - PROBLEM SELECTOR RECOMMENDATION 7
see above, in sum:   in light of multiple comorbidities and oncology was going to have discussion as per wife - hospice consult  will continue GOC tomorrow will follow  full code   will continue GOC conversation

## 2021-05-20 NOTE — CONSULT NOTE ADULT - PROBLEM SELECTOR RECOMMENDATION 3
continue supportive care acute in nature due to fracture  continue per primary team  continue percocet 5/325 mg PO Q4h PRN  Continue IV morphine 2 mg PRN

## 2021-05-20 NOTE — CONSULT NOTE ADULT - ASSESSMENT
75 yo M non verbal walker at home with pmh of stroke with right sided weakness, esophageal cancer a/p PEG removed; currently being treated with Chemotherapy presenting to the ED s/p mechanical fall -  Acute oblique, nondisplaced fracture of the subtrochanteric right proximal femur: PT/OT; per ortho: no surgery; # EKG: PVCs, non-sustained, QTc 517, asymptomatic  unknown Hx ischemic cardiopathy - f/o cardio; urge incontinence- Urology consulted; and, Chronic hyponatremia? - on desmopressin and fludrocortisone  pain - ATC tramadol and 1 percocet/24H    Morphine Equivalent Daily Dose (MEDD)      See Recs below.    Please call   Carolann Albarran  or [phone] 24/7  with questions or concerns.   We will continue to follow.      75 yo M non verbal walker at home with pmh of stroke with right sided weakness, esophageal cancer a/p PEG removed; currently being treated with Chemotherapy presenting to the ED s/p mechanical fall -  Acute oblique, nondisplaced fracture of the subtrochanteric right proximal femur: PT/OT; per ortho: no surgery; # EKG: PVCs, non-sustained, QTc 517, asymptomatic  unknown Hx ischemic cardiopathy - f/o cardio; urge incontinence- Urology consulted; and, Chronic hyponatremia? - on desmopressin and fludrocortisone  pain - ATC tramadol and 1 percocet/24H    Morphine Equivalent Daily Dose (MEDD) 7.5mg      See Recs below.    Please call   Carolann Albarran  or [phone] 24/7  with questions or concerns.   We will continue to follow.      75 yo M non verbal walker at home with pmh of stroke with right sided weakness, esophageal cancer a/p PEG removed; currently being treated with Chemotherapy presenting to the ED s/p mechanical fall -  Acute oblique, nondisplaced fracture of the subtrochanteric right proximal femur: PT/OT; per ortho: no surgery; # EKG: PVCs, non-sustained, QTc 517, asymptomatic  unknown Hx ischemic cardiopathy - f/o cardio; urge incontinence- Urology consulted; and, Chronic hyponatremia? - on desmopressin and fludrocortisone  pain - ATC tramadol and 1 percocet/24H    Morphine Equivalent Daily Dose (MEDD) 7.5mg      See Recs below.    Please call   Carolann Albarran  or [phone] 24/7  with questions or concerns.   We will continue to follow.   Discussed with medical team and bedside RN

## 2021-05-20 NOTE — CONSULT NOTE ADULT - CONVERSATION DETAILS
Due to expressive aphasia, wife does all communicating. She taught back current diagnosis and that [facility] oncologist was not going to continue offering disease directed treatments and had been starting to take about transition to hospice before mechanical fall and this acute change/admission. She detailed meticulous care of patient at home and during [facility] hospitalization last year and how it challenged her more than caring for her disabled son. Exxpressed concern/worries/sadness about being able to continue caring, but is resolved to bring home.   Patient was taken down to test before our conversation completed.   Wife stated that she know he understands, but that decisions do need to be made (eg DNR/I) and welcomes Palliative Care to lead that conversation.   Open to hospice information. Due to expressive aphasia, wife does all communicating. She taught back current diagnosis and that [facility] oncologist was not going to continue offering disease directed treatments and had been starting to take about transition to hospice before mechanical fall and this acute change/admission. She detailed meticulous care of patient at home and during [facility] hospitalization last year and how it challenged her more than caring for her disabled son. Expressed concern/worries/sadness about being able to continue caring, but is resolved to bring home.   Patient was taken down to test before our conversation completed.   Wife stated that she know he understands, but that decisions do need to be made (eg DNR/I) and welcomes Palliative Care to lead that conversation.   Open to hospice information.

## 2021-05-20 NOTE — PROGRESS NOTE ADULT - ASSESSMENT
· Assessment	  75 yo M non verbal with pmh of stroke with right sided weakness, esophageal cancer currently being treated with Chemotherapy presenting to the ED s/p mechanical fall.     # Acute oblique, nondisplaced fracture of the subtrochanteric right proximal femur  # s/p Mechanical fall  - NWB RLE   - pain control w/ tramadol around the clock (pt is non-verbal and unable to express pain); morphine 2 mg PRN for severe pain  - cont PT/OT  - Lovenox 40 qHS for DVT ppx  - per ortho: no surgery       # EKG: PVCs, non-sustained, QTc 517, asymptomatic  unknown Hx ischemic cardiopathy, Trop 0.02  neg AV block  f/o cardio      urge incontinence  Urology consulted       # Hx of esophageal CA  # s/p PEG tube removal      # Stroke with right sided weakness  - c/w plavix     # HTN  # HLD  - c/w home meds  - DASH diet    # Chronic hyponatremia?  - Unclear why pt is on desmopressin and fludrocortisone as per wife, he had been taking it for years.   - c/w home meds    Diet: DASH diet    DVT ppx: Lovenox 40 mg qHS  Activity: Increase as tolerated  Code status: Full Code          · Assessment	  75 yo M non verbal with pmh of stroke with right sided weakness, esophageal cancer currently being treated with Chemotherapy presenting to the ED s/p mechanical fall.     # Acute oblique, nondisplaced fracture of the subtrochanteric right proximal femur  # s/p Mechanical fall  - NWB RLE   - pain control w/ tramadol around the clock (pt is non-verbal and unable to express pain); morphine 2 mg PRN for severe pain  - cont PT/OT  - Lovenox 40 qHS for DVT ppx  - per ortho: no surgery       # EKG: PVCs, non-sustained, QTc 517, asymptomatic  unknown Hx ischemic cardiopathy, Trop 0.02  neg AV block  f/o cardio      urge incontinence  Urology consulted       # Hx of esophageal CA  # s/p PEG tube removal      # Stroke with right sided weakness  - c/w plavix     # HTN  # HLD  - c/w home meds  - DASH diet    # Chronic hyponatremia?  - Unclear why pt is on desmopressin and fludrocortisone as per wife, he had been taking it for years.   - c/w home meds    Diet: DASH diet    DVT ppx: Lovenox 40 mg qHS  Activity: Increase as tolerated  Code status: Full Code       Wife is teary Had a long discussion with me for palliative Vs Hospice care  I have consulted Palliative care team

## 2021-05-20 NOTE — PROGRESS NOTE ADULT - SUBJECTIVE AND OBJECTIVE BOX
Admitted for Acute oblique, nondisplaced periprosthetic fracture of the subtrochanteric right proximal femur.  Per Ortho: no surgery intervention    S  Comfortable  No pain   eating fine         Physical Exam: PHYSICAL EXAM:  GENERAL: NAD, Alert, 76y M, non-verbal  HEAD:  Atraumatic, Normocephalic  EYES: EOMI, conjunctiva clear and sclera white  NECK: Supple, No JVD  CHEST/LUNG: Clear to auscultation bilaterally; No wheeze; No crackles; No accessory muscles used  HEART: Regular rate and rhythm; No murmurs;   ABDOMEN: Hx of peg tube w/ well healed surgery scar. Soft, Nontender, Nondistended; Bowel sounds present; No guarding  EXTREMITIES:  R LE is tender   NEUROLOGY: non-focal        VITALS:  T(F): 97.9 (05-18-21 @ 13:53), Max: 98.2 (05-18-21 @ 05:23)  HR: 76 (05-18-21 @ 13:53)  BP: 115/64 (05-18-21 @ 13:53) (115/64 - 191/104)  RR: 18 (05-18-21 @ 13:53)  SpO2: 95% (05-18-21 @ 13:53)    TESTS & MEASUREMENTS:  Height (cm): 165.1 (05-17-21 @ 22:22)  Weight (kg): 53.9 (05-17-21 @ 22:22)  BMI (kg/m2): 19.8 (05-17-21 @ 22:22)    05-17-21 @ 07:01  -  05-18-21 @ 07:00  --------------------------------------------------------  IN: 120 mL / OUT: 0 mL / NET: 120 mL                            8.6    8.23  )-----------( 166      ( 18 May 2021 06:21 )             27.3       05-18    142  |  104  |  16  ----------------------------<  95  3.6   |  23  |  1.0    Ca    8.7      18 May 2021 06:21  Phos  3.7     05-18  Mg     1.8     05-18    TPro  5.6<L>  /  Alb  3.2<L>  /  TBili  0.7  /  DBili  x   /  AST  17  /  ALT  12  /  AlkPhos  115  05-17    LIVER FUNCTIONS - ( 17 May 2021 09:38 )  Alb: 3.2 g/dL / Pro: 5.6 g/dL / ALK PHOS: 115 U/L / ALT: 12 U/L / AST: 17 U/L / GGT: x           CARDIAC MARKERS ( 17 May 2021 09:38 )  x     / 0.02 ng/mL / x     / x     / x                    MEDICATIONS:  MEDICATIONS  (STANDING):  atorvastatin 20 milliGRAM(s) Oral at bedtime  clopidogrel Tablet 75 milliGRAM(s) Oral daily  colchicine 0.6 milliGRAM(s) Oral daily  desmopressin 0.2 milliGRAM(s) Oral daily  enoxaparin Injectable 40 milliGRAM(s) SubCutaneous at bedtime  escitalopram 20 milliGRAM(s) Oral daily  fludroCORTISONE 0.1 milliGRAM(s) Oral two times a day  megestrol 20 milliGRAM(s) Oral daily  metoprolol tartrate 25 milliGRAM(s) Oral two times a day  tamsulosin 0.4 milliGRAM(s) Oral at bedtime  traMADol 50 milliGRAM(s) Oral every 4 hours    MEDICATIONS  (PRN):  morphine  - Injectable 2 milliGRAM(s) IV Push every 4 hours PRN Severe Pain (7 - 10)  oxycodone    5 mG/acetaminophen 325 mG 1 Tablet(s) Oral every 4 hours PRN Moderate Pain (4 - 6)  senna 2 Tablet(s) Oral at bedtime PRN Constipation              HEALTH ISSUES - PROBLEM Dx:                     Admitted for Acute oblique, nondisplaced periprosthetic fracture of the subtrochanteric right proximal femur.  Per Ortho: no surgery intervention    S  Comfortable  No pain   Wife at bedside         Physical Exam: PHYSICAL EXAM:  GENERAL: NAD, Alert, 76y M, non-verbal  HEAD:  Atraumatic, Normocephalic  EYES: EOMI, conjunctiva clear and sclera white  NECK: Supple, No JVD  CHEST/LUNG: Clear to auscultation bilaterally; No wheeze; No crackles; No accessory muscles used  HEART: Regular rate and rhythm; No murmurs;   ABDOMEN: Hx of peg tube w/ well healed surgery scar. Soft, Nontender, Nondistended; Bowel sounds present; No guarding  EXTREMITIES:  R LE is tender  NEUROLOGY: Alert, R hemiplegia, aphasic        VITALS:  T(F): 97.9 (05-18-21 @ 13:53), Max: 98.2 (05-18-21 @ 05:23)  HR: 76 (05-18-21 @ 13:53)  BP: 115/64 (05-18-21 @ 13:53) (115/64 - 191/104)  RR: 18 (05-18-21 @ 13:53)  SpO2: 95% (05-18-21 @ 13:53)    TESTS & MEASUREMENTS:  Height (cm): 165.1 (05-17-21 @ 22:22)  Weight (kg): 53.9 (05-17-21 @ 22:22)  BMI (kg/m2): 19.8 (05-17-21 @ 22:22)    05-17-21 @ 07:01  -  05-18-21 @ 07:00  --------------------------------------------------------  IN: 120 mL / OUT: 0 mL / NET: 120 mL                            8.6    8.23  )-----------( 166      ( 18 May 2021 06:21 )             27.3       05-18    142  |  104  |  16  ----------------------------<  95  3.6   |  23  |  1.0    Ca    8.7      18 May 2021 06:21  Phos  3.7     05-18  Mg     1.8     05-18    TPro  5.6<L>  /  Alb  3.2<L>  /  TBili  0.7  /  DBili  x   /  AST  17  /  ALT  12  /  AlkPhos  115  05-17    LIVER FUNCTIONS - ( 17 May 2021 09:38 )  Alb: 3.2 g/dL / Pro: 5.6 g/dL / ALK PHOS: 115 U/L / ALT: 12 U/L / AST: 17 U/L / GGT: x           CARDIAC MARKERS ( 17 May 2021 09:38 )  x     / 0.02 ng/mL / x     / x     / x                    MEDICATIONS:  MEDICATIONS  (STANDING):  atorvastatin 20 milliGRAM(s) Oral at bedtime  clopidogrel Tablet 75 milliGRAM(s) Oral daily  colchicine 0.6 milliGRAM(s) Oral daily  desmopressin 0.2 milliGRAM(s) Oral daily  enoxaparin Injectable 40 milliGRAM(s) SubCutaneous at bedtime  escitalopram 20 milliGRAM(s) Oral daily  fludroCORTISONE 0.1 milliGRAM(s) Oral two times a day  megestrol 20 milliGRAM(s) Oral daily  metoprolol tartrate 25 milliGRAM(s) Oral two times a day  tamsulosin 0.4 milliGRAM(s) Oral at bedtime  traMADol 50 milliGRAM(s) Oral every 4 hours    MEDICATIONS  (PRN):  morphine  - Injectable 2 milliGRAM(s) IV Push every 4 hours PRN Severe Pain (7 - 10)  oxycodone    5 mG/acetaminophen 325 mG 1 Tablet(s) Oral every 4 hours PRN Moderate Pain (4 - 6)  senna 2 Tablet(s) Oral at bedtime PRN Constipation              HEALTH ISSUES - PROBLEM Dx:

## 2021-05-20 NOTE — CONSULT NOTE ADULT - PROBLEM SELECTOR RECOMMENDATION 9
not surgical candidate  care per primary/ortho/rehab teams not surgical candidate  care per primary/ortho/rehab teams  Continue percocet PRN 5/325 mg for pain  Continue PRN IV morphine 2 mg for pain

## 2021-05-20 NOTE — CONSULT NOTE ADULT - SUBJECTIVE AND OBJECTIVE BOX
JODI GODINEZ          MRN-[id number]              HPI:  77 yo M non verbal with pmh of stroke with right sided weakness, esophageal cancer currently being treated with Chemotherapy presenting to the ED today with his wife bedside, after a fall at home last night while the patient was walking to the bathroom with right hip pain. According to patient's wife he has not been ambulating much recently due to overall weakness. A baseline patient ambulates with an assistive device. No further complaints.     Vital Signs Last 24 Hrs  T(C): 36.6 (17 May 2021 20:44), Max: 36.8 (17 May 2021 09:05)  T(F): 97.9 (17 May 2021 20:44), Max: 98.3 (17 May 2021 09:05)  HR: 83 (17 May 2021 20:44) (71 - 83)  BP: 191/104 (17 May 2021 20:44) (165/88 - 191/104)  BP(mean): --  RR: 18 (17 May 2021 20:44) (18 - 18)  SpO2: 96% (17 May 2021 20:44) (96% - 98%)    In ED, trauma team consulted. Pt found to have R non-displaced subtrochanteric fx. Seen by ortho and no plan for sx at this time.  (17 May 2021 22:02)      PAST MEDICAL & SURGICAL HISTORY:  Non Hodgkin&#x27;s lymphoma  on observation    Esophageal cancer  s/p PEG   Port placement on 2/10 for neoadjuvant therapy    CVA (cerebral vascular accident)  with aphasia    History of BPH    Depression    Gout    Esophageal cancer  on chemo. s/p PEG tube    Cataract    History of hip replacement    PEG (percutaneous endoscopic gastrostomy) status  secondary to esophageal ca        FAMILY HISTORY:  FH: colon cancer  father     Reviewed and found non contributory in mother or father    SOCIAL HISTORY:     ROS:    Unable to attain due to:              NOT ACCURATE-CONSULT NOT COMPLETE        Dyspnea (Ed 0-10): 0                       N/V (Y/N): No                             Secretions (Y/N) : No                                          Agitation(Y/N): No                              Pain (Y/N): No                                 -Provocation/Palliation: N/A  -Quality/Quantity: N/A  -Radiating: N/A  -Severity: No pain  -Timing/Frequency: N/A  -Impact on ADLs: N/A    General:  Denied  HEENT:    Denied  Neck:  Denied  CVS:  Denied  Resp:  Denied  GI:  Denied    :  Denied  Musc:  Denied  Neuro:  Denied  Psych:  Denied  Skin:  Denied  Lymph:  Denied    Allergies    No Known Drug Allergies  Originally Entered as [HIVES] reaction to [shrimp] (Unknown)  shellfish (Other)    Intolerances      Opiate Naive (Y/N): y  -iStop reviewed (Y/N): y  Ref#:       Reference #: [id number]       Medications:      MEDICATIONS  (STANDING):  atorvastatin 20 milliGRAM(s) Oral at bedtime  clopidogrel Tablet 75 milliGRAM(s) Oral daily  colchicine 0.6 milliGRAM(s) Oral daily  desmopressin 0.2 milliGRAM(s) Oral daily  enoxaparin Injectable 40 milliGRAM(s) SubCutaneous at bedtime  escitalopram 20 milliGRAM(s) Oral daily  fludroCORTISONE 0.1 milliGRAM(s) Oral two times a day  megestrol 20 milliGRAM(s) Oral daily  metoprolol tartrate 25 milliGRAM(s) Oral two times a day  tamsulosin 0.4 milliGRAM(s) Oral at bedtime    MEDICATIONS  (PRN):  morphine  - Injectable 2 milliGRAM(s) IV Push every 4 hours PRN Severe Pain (7 - 10)  oxycodone    5 mG/acetaminophen 325 mG 1 Tablet(s) Oral every 4 hours PRN Moderate Pain (4 - 6)  senna 2 Tablet(s) Oral at bedtime PRN Constipation      Labs:    CBC:                        8.7    7.43  )-----------( 157      ( 19 May 2021 07:02 )             27.3     CMP:    05-19    135  |  102  |  15  ----------------------------<  82  4.8   |  21  |  0.9  GFR 83    Ca    8.4<L>      19 May 2021 07:02       Albumin, Serum: 3.2 g/dL (05-17-21 @ 09:38)             Imaging:  Reviewed    PEx:  T(C): 36 (05-20-21 @ 05:23), Max: 36.5 (05-19-21 @ 14:00)  HR: 79 (05-20-21 @ 05:23) (79 - 92)  BP: 119/96 (05-20-21 @ 05:23) (99/61 - 119/96)  RR: 18 (05-20-21 @ 05:23) (18 - 18)  SpO2: 97% (05-20-21 @ 05:23) (97% - 98%)  Wt(kg): --  Daily     Daily                        NOT ACCURATE-CONSULT NOT COMPLETE        General: AAOx3    found in bed in NAD  HEENT:  NCAT PERRL EOMI Non icteric MOM  Neck: Supple no masses  CVS: RR S1S2 No M/G/R  Resp: Unlabored Non tachypneic No increased WOB  GI:  Soft NT ND BS+  :  Voiding / Chen / Prima[employer]  Musc: No C/C/E    Neuro: Follows commands No focal deficits  Psych: Calm Pleasant  Skin: Non jaundiced   Lymph: Normal    Preadmit Karnofsky:  %           Current Karnofsky:     %  http://www.npcrc.org/files/news/karnofsky_performance_scale.pdf   http://www.npcrc.org/files/news/palliative_performance_scale_PPSv2.pdf  Cachexia (Y/N):   BMI:    Advanced Directives:     Full Code     DNR/DNI     MOLST     HCP     DPOA     Living Will     Decision maker: The patient is able to participate in complex medical decision making conversations.   Legal surrogate:    GOALS OF CARE DISCUSSION       Palliative care info/counseling provided	           Family meeting       Advanced Directives addressed please see Advance Care Planning Note	           See previous Palliative Medicine Note       Documentation of GOC: 	    REFERRALS	        Palliative Med        Unit SW/Case Mgmt              Speech/Swallow       Patient/Family Support       Massage Therapy       Music Therapy       Pet Therapy       Hospice       Nutrition       Dietician       PT/OT JODI GODINEZ          [id number]              HPI:  77 yo M non verbal with pmh of stroke with right sided weakness, esophageal cancer currently being treated with Chemotherapy presenting to the ED today with his wife bedside, after a fall at home last night while the patient was walking to the bathroom with right hip pain. According to patient's wife he has not been ambulating much recently due to overall weakness. A baseline patient ambulates with an assistive device. No further complaints.     Vital Signs Last 24 Hrs  T(C): 36.6 (17 May 2021 20:44), Max: 36.8 (17 May 2021 09:05)  T(F): 97.9 (17 May 2021 20:44), Max: 98.3 (17 May 2021 09:05)  HR: 83 (17 May 2021 20:44) (71 - 83)  BP: 191/104 (17 May 2021 20:44) (165/88 - 191/104)  BP(mean): --  RR: 18 (17 May 2021 20:44) (18 - 18)  SpO2: 96% (17 May 2021 20:44) (96% - 98%)    In ED, trauma team consulted. Pt found to have R non-displaced subtrochanteric fx. Seen by ortho and no plan for sx at this time.  (17 May 2021 22:02)      PAST MEDICAL & SURGICAL HISTORY:  Non Hodgkin&#x27;s lymphoma  on observation    Esophageal cancer  s/p PEG   Port placement on 2/10 for neoadjuvant therapy    CVA (cerebral vascular accident)  with aphasia    History of BPH    Depression    Gout    Esophageal cancer  on chemo. s/p PEG tube    Cataract    History of hip replacement    PEG (percutaneous endoscopic gastrostomy) status  secondary to esophageal ca        FAMILY HISTORY:  FH: colon cancer  father     Reviewed and found non contributory in mother or father    SOCIAL HISTORY:     ROS:    Unable to attain due to:                    Dyspnea (Ed 0-10): 0                       N/V (Y/N): No                             Secretions (Y/N) : No                                          Agitation(Y/N): No                              Pain (Y/N): with movement - well controlled with current regimen                            -Provocation/Palliation: N/A  -Quality/Quantity: N/A  -Radiating: N/A  -Severity: No pain  -Timing/Frequency: N/A  -Impact on ADLs: significant due to fracture    General:  Denied  HEENT:    Denied  Neck:  Denied  CVS:  Denied  Resp:  Denied  GI:  Denied    :  recent Urge UI - uro working up  Musc:  Denied  Neuro:  Denied  Psych:  Denied  Skin:  Denied  Lymph:  Denied    Allergies    No Known Drug Allergies  Originally Entered as [HIVES] reaction to [shrimp] (Unknown)  shellfish (Other)    Intolerances      Opiate Naive (Y/N): y  -iStop reviewed (Y/N): y  Ref#:       Reference #: [id number]       Medications:      MEDICATIONS  (STANDING):  atorvastatin 20 milliGRAM(s) Oral at bedtime  clopidogrel Tablet 75 milliGRAM(s) Oral daily  colchicine 0.6 milliGRAM(s) Oral daily  desmopressin 0.2 milliGRAM(s) Oral daily  enoxaparin Injectable 40 milliGRAM(s) SubCutaneous at bedtime  escitalopram 20 milliGRAM(s) Oral daily  fludroCORTISONE 0.1 milliGRAM(s) Oral two times a day  megestrol 20 milliGRAM(s) Oral daily  metoprolol tartrate 25 milliGRAM(s) Oral two times a day  tamsulosin 0.4 milliGRAM(s) Oral at bedtime    MEDICATIONS  (PRN):  morphine  - Injectable 2 milliGRAM(s) IV Push every 4 hours PRN Severe Pain (7 - 10) none  oxycodone    5 mG/acetaminophen 325 mG 1 Tablet(s) Oral every 4 hours PRN Moderate Pain (4 - 6) x1  senna 2 Tablet(s) Oral at bedtime PRN Constipation      Labs:    CBC:                        8.7    7.43  )-----------( 157      ( 19 May 2021 07:02 )             27.3     CMP:    05-19    135  |  102  |  15  ----------------------------<  82  4.8   |  21  |  0.9  GFR 83    Ca    8.4<L>      19 May 2021 07:02       Albumin, Serum: 3.2 g/dL (05-17-21 @ 09:38)             Imaging:  Reviewed    PEx:  T(C): 36 (05-20-21 @ 05:23), Max: 36.5 (05-19-21 @ 14:00)  HR: 79 (05-20-21 @ 05:23) (79 - 92)  BP: 119/96 (05-20-21 @ 05:23) (99/61 - 119/96)  RR: 18 (05-20-21 @ 05:23) (18 - 18)  SpO2: 97% (05-20-21 @ 05:23) (97% - 98%)  Wt(kg): --  Daily     Daily                            General: AAOx3    found in bed in NAD expressive aphasia  HEENT:  NCAT PER Non icteric  right droop  CVS: not tachy  Resp: Unlabored Non tachypneic No increased WOB  GI:  Soft NT ND  :  Voiding incontinent at times  Musc: No C/C/E    Neuro: Follows commands residual right hemiparesis  Psych: Calm Pleasant  Skin: Non jaundiced       Preadmit Karnofsky:  %  50        Current Karnofsky:  40  %  http://www.npcrc.org/files/news/karnofsky_performance_scale.pdf   http://www.npcrc.org/files/news/palliative_performance_scale_PPSv2.pdf  Cachexia (Y/N): n  BMI:    Advanced Directives:     Full Code         Decision maker: The patient is able to participate in complex medical decision making conversations. Does with wife  Legal surrogate:    GOALS OF CARE DISCUSSION       Palliative care info/counseling provided	             Documentation of GOC: see below	    REFERRALS	        Palliative Med        Unit SW/Case Mgmt                   PT/OT JODI GODINEZ          [id number]              HPI:  77 yo M non verbal with pmh of stroke with right sided weakness, esophageal cancer currently being treated with Chemotherapy presenting to the ED today with his wife bedside, after a fall at home last night while the patient was walking to the bathroom with right hip pain. According to patient's wife he has not been ambulating much recently due to overall weakness. A baseline patient ambulates with an assistive device. No further complaints.     Vital Signs Last 24 Hrs  T(C): 36.6 (17 May 2021 20:44), Max: 36.8 (17 May 2021 09:05)  T(F): 97.9 (17 May 2021 20:44), Max: 98.3 (17 May 2021 09:05)  HR: 83 (17 May 2021 20:44) (71 - 83)  BP: 191/104 (17 May 2021 20:44) (165/88 - 191/104)  BP(mean): --  RR: 18 (17 May 2021 20:44) (18 - 18)  SpO2: 96% (17 May 2021 20:44) (96% - 98%)    In ED, trauma team consulted. Pt found to have R non-displaced subtrochanteric fx. Seen by ortho and no plan for sx at this time.  (17 May 2021 22:02)    PAST MEDICAL & SURGICAL HISTORY:  Non Hodgkin&#x27;s lymphoma  on observation    Esophageal cancer  s/p PEG   Port placement on 2/10 for neoadjuvant therapy    CVA (cerebral vascular accident)  with aphasia    History of BPH    Depression    Gout    Esophageal cancer  on chemo. s/p PEG tube    Cataract    History of hip replacement    PEG (percutaneous endoscopic gastrostomy) status  secondary to esophageal ca      FAMILY HISTORY:  FH: colon cancer  father     Reviewed and found non contributory in mother or father    SOCIAL HISTORY: lives at home with wife. no hx of smoking or alcohol use     ROS:                    Dyspnea (Ed 0-10): 0                       N/V (Y/N): No                             Secretions (Y/N) : No                                          Agitation(Y/N): No                              Pain (Y/N): with movement - well controlled with current regimen                            -Provocation/Palliation: N/A  -Quality/Quantity: N/A  -Radiating: N/A  -Severity: No pain  -Timing/Frequency: N/A  -Impact on ADLs: significant due to fracture    General:  Denied  HEENT:    Denied  Neck:  Denied  CVS:  Denied  Resp:  Denied  GI:  Denied    :  recent Urge UI - uro working up  Musc:  Denied  Neuro:  Denied  Psych:  Denied  Skin:  Denied  Lymph:  Denied    Allergies    No Known Drug Allergies  Originally Entered as [HIVES] reaction to [shrimp] (Unknown)  shellfish (Other)    Intolerances      Opiate Naive (Y/N): y  -iStop reviewed (Y/N): y  Ref#:       Reference #: [id number]       Medications:      MEDICATIONS  (STANDING):  atorvastatin 20 milliGRAM(s) Oral at bedtime  clopidogrel Tablet 75 milliGRAM(s) Oral daily  colchicine 0.6 milliGRAM(s) Oral daily  desmopressin 0.2 milliGRAM(s) Oral daily  enoxaparin Injectable 40 milliGRAM(s) SubCutaneous at bedtime  escitalopram 20 milliGRAM(s) Oral daily  fludroCORTISONE 0.1 milliGRAM(s) Oral two times a day  megestrol 20 milliGRAM(s) Oral daily  metoprolol tartrate 25 milliGRAM(s) Oral two times a day  tamsulosin 0.4 milliGRAM(s) Oral at bedtime    MEDICATIONS  (PRN):  morphine  - Injectable 2 milliGRAM(s) IV Push every 4 hours PRN Severe Pain (7 - 10) none  oxycodone    5 mG/acetaminophen 325 mG 1 Tablet(s) Oral every 4 hours PRN Moderate Pain (4 - 6) x1  senna 2 Tablet(s) Oral at bedtime PRN Constipation      Labs:    CBC:                        8.7    7.43  )-----------( 157      ( 19 May 2021 07:02 )             27.3     CMP:    05-19    135  |  102  |  15  ----------------------------<  82  4.8   |  21  |  0.9  GFR 83    Ca    8.4<L>      19 May 2021 07:02       Albumin, Serum: 3.2 g/dL (05-17-21 @ 09:38)    Imaging:  Reviewed personally    EKG reviewed personally    PEx:  T(C): 36 (05-20-21 @ 05:23), Max: 36.5 (05-19-21 @ 14:00)  HR: 79 (05-20-21 @ 05:23) (79 - 92)  BP: 119/96 (05-20-21 @ 05:23) (99/61 - 119/96)  RR: 18 (05-20-21 @ 05:23) (18 - 18)  SpO2: 97% (05-20-21 @ 05:23) (97% - 98%)  Wt(kg): --  Daily     Daily                            General: AAOx3    found in bed in NAD expressive aphasia  HEENT:  NCAT PER Non icteric  right droop  CVS: not tachy  Resp: Unlabored Non tachypneic No increased WOB  GI:  Soft NT ND  :  Voiding incontinent at times  Musc: No C/C/E    Neuro: Follows commands residual right hemiparesis  Psych: Calm Pleasant  Skin: Non jaundiced       Preadmit Karnofsky:  %  50        Current Karnofsky:  40  %  http://www.npcrc.org/files/news/karnofsky_performance_scale.pdf   http://www.npcrc.org/files/news/palliative_performance_scale_PPSv2.pdf  Cachexia (Y/N): n  BMI:     Advanced Directives:     Full Code       Decision maker: The patient is able to participate in complex medical decision making conversations. Does with wife  Legal surrogate: Wife     GOALS OF CARE DISCUSSION       Palliative care info/counseling provided	             Documentation of GOC: see below	    REFERRALS	        Palliative Med        Unit SW/Case Mgmt                   PT/OT

## 2021-05-21 ENCOUNTER — TRANSCRIPTION ENCOUNTER (OUTPATIENT)
Age: 76
End: 2021-05-21

## 2021-05-21 VITALS — DIASTOLIC BLOOD PRESSURE: 66 MMHG | HEART RATE: 85 BPM | SYSTOLIC BLOOD PRESSURE: 103 MMHG

## 2021-05-21 LAB
ANION GAP SERPL CALC-SCNC: 14 MMOL/L — SIGNIFICANT CHANGE UP (ref 7–14)
BUN SERPL-MCNC: 20 MG/DL — SIGNIFICANT CHANGE UP (ref 10–20)
CALCIUM SERPL-MCNC: 8.3 MG/DL — LOW (ref 8.5–10.1)
CHLORIDE SERPL-SCNC: 103 MMOL/L — SIGNIFICANT CHANGE UP (ref 98–110)
CO2 SERPL-SCNC: 21 MMOL/L — SIGNIFICANT CHANGE UP (ref 17–32)
CREAT SERPL-MCNC: 0.9 MG/DL — SIGNIFICANT CHANGE UP (ref 0.7–1.5)
GLUCOSE SERPL-MCNC: 84 MG/DL — SIGNIFICANT CHANGE UP (ref 70–99)
HCT VFR BLD CALC: 26.2 % — LOW (ref 42–52)
HGB BLD-MCNC: 8.2 G/DL — LOW (ref 14–18)
MAGNESIUM SERPL-MCNC: 1.9 MG/DL — SIGNIFICANT CHANGE UP (ref 1.8–2.4)
MCHC RBC-ENTMCNC: 27.2 PG — SIGNIFICANT CHANGE UP (ref 27–31)
MCHC RBC-ENTMCNC: 31.3 G/DL — LOW (ref 32–37)
MCV RBC AUTO: 87 FL — SIGNIFICANT CHANGE UP (ref 80–94)
NRBC # BLD: 0 /100 WBCS — SIGNIFICANT CHANGE UP (ref 0–0)
PLATELET # BLD AUTO: 162 K/UL — SIGNIFICANT CHANGE UP (ref 130–400)
POTASSIUM SERPL-MCNC: 3.6 MMOL/L — SIGNIFICANT CHANGE UP (ref 3.5–5)
POTASSIUM SERPL-SCNC: 3.6 MMOL/L — SIGNIFICANT CHANGE UP (ref 3.5–5)
RBC # BLD: 3.01 M/UL — LOW (ref 4.7–6.1)
RBC # FLD: 18.4 % — HIGH (ref 11.5–14.5)
SODIUM SERPL-SCNC: 138 MMOL/L — SIGNIFICANT CHANGE UP (ref 135–146)
TROPONIN T SERPL-MCNC: <0.01 NG/ML — SIGNIFICANT CHANGE UP
WBC # BLD: 6.15 K/UL — SIGNIFICANT CHANGE UP (ref 4.8–10.8)
WBC # FLD AUTO: 6.15 K/UL — SIGNIFICANT CHANGE UP (ref 4.8–10.8)

## 2021-05-21 PROCEDURE — 99239 HOSP IP/OBS DSCHRG MGMT >30: CPT

## 2021-05-21 PROCEDURE — 99233 SBSQ HOSP IP/OBS HIGH 50: CPT

## 2021-05-21 RX ORDER — SENNA PLUS 8.6 MG/1
2 TABLET ORAL AT BEDTIME
Refills: 0 | Status: DISCONTINUED | OUTPATIENT
Start: 2021-05-21 | End: 2021-05-21

## 2021-05-21 RX ADMIN — DESMOPRESSIN ACETATE 0.2 MILLIGRAM(S): 0.1 TABLET ORAL at 12:10

## 2021-05-21 RX ADMIN — OXYCODONE AND ACETAMINOPHEN 1 TABLET(S): 5; 325 TABLET ORAL at 06:46

## 2021-05-21 RX ADMIN — OXYCODONE AND ACETAMINOPHEN 1 TABLET(S): 5; 325 TABLET ORAL at 07:15

## 2021-05-21 RX ADMIN — FLUDROCORTISONE ACETATE 0.1 MILLIGRAM(S): 0.1 TABLET ORAL at 05:54

## 2021-05-21 RX ADMIN — FLUDROCORTISONE ACETATE 0.1 MILLIGRAM(S): 0.1 TABLET ORAL at 17:16

## 2021-05-21 RX ADMIN — Medication 0.6 MILLIGRAM(S): at 12:09

## 2021-05-21 RX ADMIN — MEGESTROL ACETATE 20 MILLIGRAM(S): 40 SUSPENSION ORAL at 12:10

## 2021-05-21 RX ADMIN — Medication 25 MILLIGRAM(S): at 05:53

## 2021-05-21 RX ADMIN — ESCITALOPRAM OXALATE 20 MILLIGRAM(S): 10 TABLET, FILM COATED ORAL at 12:09

## 2021-05-21 RX ADMIN — CLOPIDOGREL BISULFATE 75 MILLIGRAM(S): 75 TABLET, FILM COATED ORAL at 12:09

## 2021-05-21 NOTE — DISCHARGE NOTE PROVIDER - NSFOLLOWUPCLINICS_GEN_ALL_ED_FT
[facility]  Orthpedic  [address], [place]   Phone: [phone]  Fax:

## 2021-05-21 NOTE — HOSPICE CARE NOTE - CONVESATION DETAILS
Follow up call placed to patient's spouse Renu. Renu considering home hospice services. Requesting follow up within the hour.

## 2021-05-21 NOTE — DISCHARGE NOTE PROVIDER - NSDCMRMEDTOKEN_GEN_ALL_CORE_FT
atorvastatin 20 mg oral tablet: 1 tab(s) orally once a day  colchicine 0.6 mg oral tablet: 1 tab(s) orally once a day  desmopressin 0.2 mg oral tablet: orally once a day  escitalopram 20 mg oral tablet: 1 tab(s) orally once a day  fludrocortisone 0.1 mg oral tablet: 1 tab(s) orally 2 times a day  megestrol 20 mg oral tablet: 1 tab(s) orally once a day  metoprolol tartrate 25 mg oral tablet: 1 tab(s) orally 2 times a day  oxycodone-acetaminophen 5 mg-325 mg oral tablet: 1 tab(s) orally every 4 hours, As needed, Moderate Pain (4 - 6)  Plavix 75 mg oral tablet: 1 tab(s) orally once a day  tamsulosin 0.4 mg oral capsule: 1 cap(s) orally once a day

## 2021-05-21 NOTE — PROGRESS NOTE ADULT - PROBLEM SELECTOR PLAN 5
In the setting of metastatic disease and overall poor conditioning.  -continue treatment per primary team  -no need for pharmacological treatment of fatigue at this time.

## 2021-05-21 NOTE — DISCHARGE NOTE PROVIDER - NSDCCPCAREPLAN_GEN_ALL_CORE_FT
PRINCIPAL DISCHARGE DIAGNOSIS  Diagnosis: Subtrochanteric fracture of right femur, closed, initial encounter  Assessment and Plan of Treatment: Please follow up with your primay care doctor in 1 week or sooner.   Please follow up with orthopedics in 6 weeks.   - If you experience weakness/inability to move extremities, loss of vison, swelling/warmth/redness/loss of sensation of your lower extremities, chest pain, shortness of breath, or facial droop, call [phone] or go to the ED immediately.  - Take your medication as prescribed

## 2021-05-21 NOTE — PROGRESS NOTE ADULT - PROBLEM SELECTOR PLAN 1
Patient is not a surgical candidate  care per primary/ortho/rehab teams  Continue percocet PRN 5/325 mg for pain  Continue PRN IV morphine 2 mg for pain.

## 2021-05-21 NOTE — DISCHARGE NOTE PROVIDER - NSDCCPTREATMENT_GEN_ALL_CORE_FT
PRINCIPAL PROCEDURE  Procedure: Characterization of urinary incontinence  Findings and Treatment: Please follow up with urology in 8 weeks for further workup of urge incontience episodes and/or urodynamics testing

## 2021-05-21 NOTE — PROGRESS NOTE ADULT - PROBLEM SELECTOR PLAN 3
Acute in nature due to fracture  continue per primary team  continue percocet 5/325 mg PO Q4h PRN  Continue IV morphine 2 mg PRN.

## 2021-05-21 NOTE — DISCHARGE NOTE PROVIDER - CARE PROVIDER_API CALL
Yifan Gillis  INTERNAL MEDICINE  [address] [place] [address]  Phone: [phone]  Fax: [phone]  Follow Up Time: 1 week    Jeff Cruz  UROLOGY  [address] [place] [address], [address]  [place] [address]  Phone: [phone]  Fax: [phone]  Follow Up Time:

## 2021-05-21 NOTE — PROGRESS NOTE ADULT - PROBLEM SELECTOR PLAN 2
Per wife - no longer being offered disease directed care  tolerating PO diet  no symptoms to manage.

## 2021-05-21 NOTE — DISCHARGE NOTE PROVIDER - HOSPITAL COURSE
77 y/o M non verbal with past medical history of stroke with right sided weakness, esophageal cancer( on chemo) presenting   to the ED for right hip pain, occurring after a fall at home. Pt also not ambulating as compared to baseline due   to overall weakness as noted by patient's wife. Right non-displaced subtrochanteric fracture seen on xray. Right knee xray revealed no acute fracture. Patient admitted for further evaluation and pain control. Patient seen by ortho-no recommednations for no surgical interventions and weight bearing as tolerated for 6 weeks order noted.  at the time. Patient also experienced 2-3 episodes of urge incontinence as noted by patient's wife. U/S workup implemented as per urology recommendations; renal/bladder ultrasound showed no signs of hydronephrosis & scrotal sonogram revealed no signs of orchitis. Given urge incontinence episodes subsided after the initial 3-4 episodes, recommendations to followup urology for outpatient urodynamics and/or further workup. Pt examined and deemed medically stable for discharge by

## 2021-05-21 NOTE — PROGRESS NOTE ADULT - ASSESSMENT
75 yo M non verbal walker at home with pmh of stroke with right sided weakness, esophageal cancer a/p PEG removed; currently being treated with Chemotherapy presenting to the ED s/p mechanical fall -  Acute oblique, nondisplaced fracture of the subtrochanteric right proximal femur: PT/OT; per ortho: no surgery; # EKG: PVCs, non-sustained, QTc 517, asymptomatic.  Wife considering hospice.   Met with wife, she is unsure regarding hospice care as she wants more help at home. She relayed that she spoke to [facility] who are offering her more assistance at home and plan is to transition him to hospice from home.       Morphine Equivalent Daily Dose (MEDD) 15mg     See Recs below.    Please call [phone] with questions or concerns.   We will continue to follow.   Discussed with medical team and bedside RN and wife

## 2021-05-21 NOTE — DISCHARGE NOTE PROVIDER - CARE PROVIDERS DIRECT ADDRESSES
,DirectAddress_Unknown,[email or web address]

## 2021-05-21 NOTE — PROGRESS NOTE ADULT - SUBJECTIVE AND OBJECTIVE BOX
JODI GODINEZ             MRN-[id number]    CC: S/p fall     HPI:  77 yo M non verbal with pmh of stroke with right sided weakness, esophageal cancer currently being treated with Chemotherapy presenting to the ED today with his wife bedside, after a fall at home last night while the patient was walking to the bathroom with right hip pain. According to patient's wife he has not been ambulating much recently due to overall weakness. A baseline patient ambulates with an assistive device. No further complaints.     Vital Signs Last 24 Hrs  T(C): 36.6 (17 May 2021 20:44), Max: 36.8 (17 May 2021 09:05)  T(F): 97.9 (17 May 2021 20:44), Max: 98.3 (17 May 2021 09:05)  HR: 83 (17 May 2021 20:44) (71 - 83)  BP: 191/104 (17 May 2021 20:44) (165/88 - 191/104)  BP(mean): --  RR: 18 (17 May 2021 20:44) (18 - 18)  SpO2: 96% (17 May 2021 20:44) (96% - 98%)    In ED, trauma team consulted. Pt found to have R non-displaced subtrochanteric fx. Seen by ortho and no plan for sx at this time.  (17 May 2021 22:02)      SUBJECTIVE: Patient seen and examined at bedside. He was comfortable. Met with wife, she is unsure regarding hospice care as she wants more help at home. She relayed that she spoke to [facility] who are offering her more assistance at home and plan is to transition him to hospice from home.     ROS:  DYSPNEA:N	  NAUS/VOM:  N	  SECRETIONS: YN	  AGITATION:  N  Pain (Y/N):     N    OTHER REVIEW OF SYSTEMS: fatigue and lethargy     PEx:  76y              General: AAOx3    found in bed in NAD expressive aphasia  HEENT:  NCAT PER Non icteric  right droop  CVS: not tachy  Resp: Unlabored Non tachypneic No increased WOB  GI:  Soft NT ND  :  Voiding incontinent at times  Musc: No C/C/E    Neuro: Follows commands residual right hemiparesis  Psych: Calm Pleasant  Skin: Non jaundiced      Last BM: 5/21     ALLERGIES:  NKDA     OPIATE NAÏVE (Y/N): Y     MEDICATIONS: REVIEWED  MEDICATIONS  (STANDING):  atorvastatin 20 milliGRAM(s) Oral at bedtime  clopidogrel Tablet 75 milliGRAM(s) Oral daily  colchicine 0.6 milliGRAM(s) Oral daily  desmopressin 0.2 milliGRAM(s) Oral daily  enoxaparin Injectable 40 milliGRAM(s) SubCutaneous at bedtime  escitalopram 20 milliGRAM(s) Oral daily  fludroCORTISONE 0.1 milliGRAM(s) Oral two times a day  megestrol 20 milliGRAM(s) Oral daily  metoprolol tartrate 25 milliGRAM(s) Oral two times a day  tamsulosin 0.4 milliGRAM(s) Oral at bedtime    MEDICATIONS  (PRN):  morphine  - Injectable 2 milliGRAM(s) IV Push every 4 hours PRN Severe Pain (7 - 10)  oxycodone    5 mG/acetaminophen 325 mG 1 Tablet(s) Oral every 4 hours PRN Moderate Pain (4 - 6)  senna 2 Tablet(s) Oral at bedtime PRN Constipation      LABS: REVIEWED  CBC:                        8.2    6.15  )-----------( 162      ( 21 May 2021 07:15 )             26.2     CMP:    05-21    138  |  103  |  20  ----------------------------<  84  3.6   |  21  |  0.9    Ca    8.3<L>      21 May 2021 07:15  Mg     1.9     05-21    Albumin, Serum: 3.2 g/dL (05-17-21 @ 09:38)      IMAGING: REVIEWED PErsonally    EKG reviewed PERSONALLY     ADVANCED DIRECTIVES:            FULL CODE             DECISION MAKER: Wife   LEGAL SURROGATE: Wife     PSYCHOSOCIAL-SPIRITUAL ASSESSMENT:       Reviewed       Care plan unchanged    GOALS OF CARE DISCUSSION       Palliative care info/counseling provided	           Family meeting       Advanced Directives addressed please see Advance Care Planning Note	           See previous Palliative Medicine Note       Documentation of GOC: see yesterdays note     CURRENT DISPO PLAN:     Hospice  Home      REFERRALS	        Palliative Med        Unit SW/Case Mgmt                Patient/Family Support      Hospice

## 2021-06-03 DIAGNOSIS — I69.320 APHASIA FOLLOWING CEREBRAL INFARCTION: ICD-10-CM

## 2021-06-03 DIAGNOSIS — M10.9 GOUT, UNSPECIFIED: ICD-10-CM

## 2021-06-03 DIAGNOSIS — K59.00 CONSTIPATION, UNSPECIFIED: ICD-10-CM
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
## 2021-06-03 DIAGNOSIS — I25.5 ISCHEMIC CARDIOMYOPATHY: ICD-10-CM

## 2021-06-03 DIAGNOSIS — M97.01XA PERIPROSTHETIC FRACTURE AROUND INTERNAL PROSTHETIC RIGHT HIP JOINT, INITIAL ENCOUNTER: ICD-10-CM

## 2021-06-03 DIAGNOSIS — Z93.1 GASTROSTOMY STATUS: ICD-10-CM

## 2021-06-03 DIAGNOSIS — Z79.02 LONG TERM (CURRENT) USE OF ANTITHROMBOTICS/ANTIPLATELETS: ICD-10-CM

## 2021-06-03 DIAGNOSIS — E87.1 HYPO-OSMOLALITY AND HYPONATREMIA: ICD-10-CM

## 2021-06-03 DIAGNOSIS — I49.8 OTHER SPECIFIED CARDIAC ARRHYTHMIAS: ICD-10-CM

## 2021-06-03 DIAGNOSIS — E78.00 PURE HYPERCHOLESTEROLEMIA, UNSPECIFIED: ICD-10-CM

## 2021-06-03 DIAGNOSIS — C85.90 NON-HODGKIN LYMPHOMA, UNSPECIFIED, UNSPECIFIED SITE: ICD-10-CM

## 2021-06-03 DIAGNOSIS — I49.3 VENTRICULAR PREMATURE DEPOLARIZATION: ICD-10-CM

## 2021-06-03 DIAGNOSIS — F32.9 MAJOR DEPRESSIVE DISORDER, SINGLE EPISODE, UNSPECIFIED: ICD-10-CM

## 2021-06-03 DIAGNOSIS — N39.41 URGE INCONTINENCE: ICD-10-CM

## 2021-06-03 DIAGNOSIS — Z95.828 PRESENCE OF OTHER VASCULAR IMPLANTS AND GRAFTS: ICD-10-CM

## 2021-06-03 DIAGNOSIS — S72.21XA DISPLACED SUBTROCHANTERIC FRACTURE OF RIGHT FEMUR, INITIAL ENCOUNTER FOR CLOSED FRACTURE: ICD-10-CM

## 2021-06-03 DIAGNOSIS — Z91.013 ALLERGY TO SEAFOOD: ICD-10-CM

## 2021-06-03 DIAGNOSIS — E78.5 HYPERLIPIDEMIA, UNSPECIFIED: ICD-10-CM
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
## 2021-06-03 DIAGNOSIS — R91.1 SOLITARY PULMONARY NODULE: ICD-10-CM

## 2021-06-03 DIAGNOSIS — I69.351 HEMIPLEGIA AND HEMIPARESIS FOLLOWING CEREBRAL INFARCTION AFFECTING RIGHT DOMINANT SIDE: ICD-10-CM

## 2021-06-03 DIAGNOSIS — Z92.21 PERSONAL HISTORY OF ANTINEOPLASTIC CHEMOTHERAPY: ICD-10-CM

## 2021-06-03 DIAGNOSIS — C15.9 MALIGNANT NEOPLASM OF ESOPHAGUS, UNSPECIFIED: ICD-10-CM

## 2021-06-03 DIAGNOSIS — M25.551 PAIN IN RIGHT HIP: ICD-10-CM

## 2021-06-03 DIAGNOSIS — Z86.711 PERSONAL HISTORY OF PULMONARY EMBOLISM: ICD-10-CM

## 2021-06-03 DIAGNOSIS — Z51.5 ENCOUNTER FOR PALLIATIVE CARE: ICD-10-CM

## 2021-06-03 DIAGNOSIS — N40.1 BENIGN PROSTATIC HYPERPLASIA WITH LOWER URINARY TRACT SYMPTOMS: ICD-10-CM

## 2021-06-03 DIAGNOSIS — R94.31 ABNORMAL ELECTROCARDIOGRAM [ECG] [EKG]: ICD-10-CM

## 2021-06-03 DIAGNOSIS — I10 ESSENTIAL (PRIMARY) HYPERTENSION: ICD-10-CM

## 2022-05-09 NOTE — H&P ADULT - NSHPPHYSICALEXAM_GEN_ALL_CORE
PHYSICAL EXAM:  GENERAL: NAD, Alert, 76y M, non-verbal  HEAD:  Atraumatic, Normocephalic  EYES: EOMI, conjunctiva clear and sclera white  NECK: Supple, No JVD  CHEST/LUNG: Clear to auscultation bilaterally; No wheeze; No crackles; No accessory muscles used  HEART: Regular rate and rhythm; No murmurs;   ABDOMEN: Hx of peg tube w/ well healed surgery scar. Soft, Nontender, Nondistended; Bowel sounds present; No guarding  EXTREMITIES:  2+ Peripheral Pulses, No cyanosis or edema  NEUROLOGY: non-focal Cheiloplasty (Complex) Text: A decision was made to reconstruct the defect with a  cheiloplasty.  The defect was undermined extensively.  Additional orbicularis oris muscle was excised with a 15 blade scalpel.  The defect was converted into a full thickness wedge to facilite a better cosmetic result.  Small vessels were then tied off with 5-0 monocyrl. The orbicularis oris, superficial fascia, adipose and dermis were then reapproximated.  After the deeper layers were approximated the epidermis was reapproximated with particular care given to realign the vermilion border.
